# Patient Record
Sex: MALE | Race: WHITE | NOT HISPANIC OR LATINO | Employment: OTHER | URBAN - METROPOLITAN AREA
[De-identification: names, ages, dates, MRNs, and addresses within clinical notes are randomized per-mention and may not be internally consistent; named-entity substitution may affect disease eponyms.]

---

## 2017-01-13 DIAGNOSIS — Z09 FRACTURE FOLLOW-UP: Primary | ICD-10-CM

## 2017-01-16 ENCOUNTER — OFFICE VISIT (OUTPATIENT)
Dept: ORTHOPEDICS | Facility: CLINIC | Age: 72
End: 2017-01-16
Payer: MEDICARE

## 2017-01-16 ENCOUNTER — HOSPITAL ENCOUNTER (OUTPATIENT)
Dept: RADIOLOGY | Facility: HOSPITAL | Age: 72
Discharge: HOME | End: 2017-01-16
Attending: ORTHOPAEDIC SURGERY
Payer: MEDICARE

## 2017-01-16 VITALS — HEIGHT: 69 IN | WEIGHT: 167 LBS | BODY MASS INDEX: 24.73 KG/M2

## 2017-01-16 DIAGNOSIS — Z09 FRACTURE FOLLOW-UP: ICD-10-CM

## 2017-01-16 DIAGNOSIS — S52.502D CLOSED FRACTURE OF DISTAL END OF LEFT RADIUS WITH ROUTINE HEALING, UNSPECIFIED FRACTURE MORPHOLOGY, SUBSEQUENT ENCOUNTER: Primary | ICD-10-CM

## 2017-01-16 PROCEDURE — 99212 OFFICE O/P EST SF 10 MIN: CPT | Mod: PBBFAC,PO | Performed by: ORTHOPAEDIC SURGERY

## 2017-01-16 PROCEDURE — 99024 POSTOP FOLLOW-UP VISIT: CPT | Mod: ,,, | Performed by: ORTHOPAEDIC SURGERY

## 2017-01-16 PROCEDURE — 73110 X-RAY EXAM OF WRIST: CPT | Mod: 26,LT,, | Performed by: RADIOLOGY

## 2017-01-16 PROCEDURE — 99999 PR PBB SHADOW E&M-EST. PATIENT-LVL II: CPT | Mod: PBBFAC,,, | Performed by: ORTHOPAEDIC SURGERY

## 2017-01-16 NOTE — PROGRESS NOTES
Gracia is few months out from his distal radial fracture, doing well, nontender.    Good motion.  X-rays look good.  He is about six weeks out from cuff repair,   doing fairly well.  Still has some discomfort at this point, we are going to   advance his therapy.  We will check him back in six weeks' time.  No x-rays   needed.      JOÃO/VERONICA  dd: 01/16/2017 09:09:56 (CST)  td: 01/16/2017 22:02:28 (CST)  Doc ID   #[id number]  Job ID #[id number]    CC:

## 2017-01-23 ENCOUNTER — TELEPHONE (OUTPATIENT)
Dept: FAMILY MEDICINE | Facility: CLINIC | Age: 72
End: 2017-01-23

## 2017-01-24 RX ORDER — SIMVASTATIN 10 MG/1
10 TABLET, FILM COATED ORAL NIGHTLY
Qty: 90 TABLET | Refills: 0 | Status: SHIPPED | OUTPATIENT
Start: 2017-01-24 | End: 2017-02-20 | Stop reason: SDUPTHER

## 2017-01-25 ENCOUNTER — TELEPHONE (OUTPATIENT)
Dept: ORTHOPEDICS | Facility: CLINIC | Age: 72
End: 2017-01-25

## 2017-01-25 NOTE — TELEPHONE ENCOUNTER
----- Message from Charo Tse sent at 1/25/2017 10:21 AM CST -----  Contact: Nanci // [employer] // [phone]  Please call Nanci regarding paperwork she's been faxing to Dr. Real's office for signatures.    Fax # [phone]

## 2017-02-20 ENCOUNTER — OFFICE VISIT (OUTPATIENT)
Dept: FAMILY MEDICINE | Facility: CLINIC | Age: 72
End: 2017-02-20
Payer: MEDICARE

## 2017-02-20 VITALS
HEIGHT: 69 IN | BODY MASS INDEX: 25.34 KG/M2 | DIASTOLIC BLOOD PRESSURE: 70 MMHG | WEIGHT: 171.06 LBS | SYSTOLIC BLOOD PRESSURE: 111 MMHG | HEART RATE: 56 BPM

## 2017-02-20 DIAGNOSIS — I25.10 CORONARY ARTERY DISEASE, ANGINA PRESENCE UNSPECIFIED, UNSPECIFIED VESSEL OR LESION TYPE, UNSPECIFIED WHETHER NATIVE OR TRANSPLANTED HEART: ICD-10-CM

## 2017-02-20 DIAGNOSIS — I10 ESSENTIAL HYPERTENSION: Primary | ICD-10-CM

## 2017-02-20 DIAGNOSIS — H92.02 OTALGIA, LEFT: ICD-10-CM

## 2017-02-20 DIAGNOSIS — G47.00 INSOMNIA, UNSPECIFIED TYPE: ICD-10-CM

## 2017-02-20 PROCEDURE — 99214 OFFICE O/P EST MOD 30 MIN: CPT | Mod: S$PBB,,, | Performed by: FAMILY MEDICINE

## 2017-02-20 PROCEDURE — 99213 OFFICE O/P EST LOW 20 MIN: CPT | Mod: PBBFAC,PO | Performed by: FAMILY MEDICINE

## 2017-02-20 PROCEDURE — 99999 PR PBB SHADOW E&M-EST. PATIENT-LVL III: CPT | Mod: PBBFAC,,, | Performed by: FAMILY MEDICINE

## 2017-02-20 RX ORDER — TRAZODONE HYDROCHLORIDE 50 MG/1
50 TABLET ORAL NIGHTLY PRN
Qty: 90 TABLET | Refills: 0 | Status: SHIPPED | OUTPATIENT
Start: 2017-02-20 | End: 2017-04-18 | Stop reason: SDUPTHER

## 2017-02-20 RX ORDER — ATENOLOL 25 MG/1
TABLET ORAL
Qty: 30 TABLET | Refills: 3 | Status: SHIPPED | OUTPATIENT
Start: 2017-02-20 | End: 2017-12-05 | Stop reason: SDUPTHER

## 2017-02-20 RX ORDER — AMOXICILLIN AND CLAVULANATE POTASSIUM 875; 125 MG/1; MG/1
TABLET, FILM COATED ORAL
Refills: 0 | COMMUNITY
Start: 2017-01-16 | End: 2017-02-20

## 2017-02-20 RX ORDER — SIMVASTATIN 10 MG/1
10 TABLET, FILM COATED ORAL NIGHTLY
Qty: 90 TABLET | Refills: 0 | Status: SHIPPED | OUTPATIENT
Start: 2017-02-20 | End: 2017-09-11 | Stop reason: SDUPTHER

## 2017-02-20 NOTE — MR AVS SNAPSHOT
[facility]  [address] [facility]  [place] [address]  Phone: [phone]  Fax: [phone]                  Sergio Fagan III   2017 9:00 AM   Office Visit    Description:  Male : 1945   Provider:  Salvatore Mao MD   Department:  [facility]           Reason for Visit     Medication Refill     left ear pain           Diagnoses this Visit        Comments    Insomnia, unspecified type                To Do List           Future Appointments        Provider Department Dept Phone    3/6/2017 8:30 AM Sharif Real MD [facility] [phone]      Goals (5 Years of Data)     None       These Medications        Disp Refills Start End    trazodone (DESYREL) 50 MG tablet 90 tablet 0 2017     Take 1 tablet (50 mg total) by mouth nightly as needed. - Oral    Pharmacy: [facility] [place] [facility] - [address] Ph #: [phone]       simvastatin (ZOCOR) 10 MG tablet 90 tablet 0 2017    Take 1 tablet (10 mg total) by mouth every evening. - Oral    Pharmacy: [facility] [place] [facility] - [address] Ph #: [phone]       Notes to Pharmacy: Needs appt.    atenolol (TENORMIN) 25 MG tablet 30 tablet 3 2017     TAKE 1/2 TABLET BY MOUTH IN THE MORNING    Pharmacy: [facility] [place] [facility] - [address] Ph #: [phone]         [facility] On Call     [facility] Care Line -  Assistance  Registered nurses in the [facility] Center provide clinical advisement, health education, appointment booking, and other advisory services.  Call for this free service at [phone].             Medications           Message regarding Medications     Verify the changes and/or additions to your medication regime listed below are the same as discussed with your clinician today.  If any of these changes or additions are incorrect, please notify your healthcare provider.        STOP  "taking these medications     amoxicillin-clavulanate 875-125mg (AUGMENTIN) 875-125 mg per tablet TAKE 1 TABLET BY MOUTH TWICE DAILY UNTIL GONE    hydrocodone-acetaminophen 5-325mg (NORCO) 5-325 mg per tablet Take 1 tablet by mouth every 4 (four) hours as needed for Pain.    lidocaine-methyl sal-menthol (LIDOPRO PATCH) 4-4-5 % PtMd Apply 1 patch topically every 12 (twelve) hours as needed.    oxycodone-acetaminophen (PERCOCET) 5-325 mg per tablet Take 1 tablet by mouth every 4 to 6 hours as needed for Pain.    ZOSTAVAX, PF, 19,400 unit/0.65 mL injection            Verify that the below list of medications is an accurate representation of the medications you are currently taking.  If none reported, the list may be blank. If incorrect, please contact your healthcare provider. Carry this list with you in case of emergency.           Current Medications     ascorbic acid (VITAMIN C) 500 MG tablet Every morning    aspirin 81 MG chewable tablet     atenolol (TENORMIN) 25 MG tablet TAKE 1/2 TABLET BY MOUTH IN THE MORNING    multivitamin (ONE DAILY MULTIVITAMIN) per tablet Take 1 tablet by mouth once daily.    simvastatin (ZOCOR) 10 MG tablet Take 1 tablet (10 mg total) by mouth every evening.    trazodone (DESYREL) 50 MG tablet Take 1 tablet (50 mg total) by mouth nightly as needed.           Clinical Reference Information           Your Vitals Were     BP Pulse Height Weight BMI    111/70 56 5' 9" (1.753 m) 77.6 kg (171 lb 1.2 oz) 25.26 kg/m2      Blood Pressure          Most Recent Value    BP  111/70      Allergies as of 2/20/2017     No Known Allergies      Immunizations Administered on Date of Encounter - 2/20/2017     None      Orders Placed During Today's Visit      Normal Orders This Visit    Ambulatory referral to ENT       [facility] Sign-Up     Activating your [facility] account is as easy as 1-2-3!     1) Visit [email or web address], select Sign Up Now, enter this activation code and your date of birth, then select " Next.  [email or web address]  Expires: 4/6/2017  9:46 AM      2) Create a username and password to use when you visit [facility] in the future and select a security question in case you lose your password and select Next.    3) Enter your e-mail address and click Sign Up!    Additional Information  If you have questions, please e-mail [email or web address] or call [phone] to talk to our [email or web address] staff. Remember, [facility] is NOT to be used for urgent needs. For medical emergencies, dial 911.         Smoking Cessation     If you would like to quit smoking:   You may be eligible for free services if you are a [place] resident and started smoking cigarettes before September 1, 1988.  Call the Smoking Cessation Trust (SCT) toll free at (804) 573-6234 or (626) 601-9046.   Call 6-385-QUIT-NOW if you do not meet the above criteria.            Language Assistance Services     ATTENTION: Language assistance services are available, free of charge. Please call [phone].      ATENCIÓN: Si habla español, tiene a arvizu disposición servicios gratuitos de asistencia lingüística. Llame al [phone].     CHÚ Ý: N?u b?n nói Ti?ng Vi?t, có các d?ch v? h? tr? ngôn ng? mi?n phí dành cho b?n. G?i s? [phone].         [facility] complies with applicable Federal civil rights laws and does not discriminate on the basis of race, color, national origin, age, disability, or sex.

## 2017-02-26 NOTE — PROGRESS NOTES
HISTORY OF PRESENT ILLNESS:  A pleasant male here today, 71 years of age.  He   has chronic insomnia, hypertension, coronary artery disease; reviewed and   addressed each of those issues.  He is asymptomatic from that standpoint.  He is   a nonsmoker now, spends a lot of times in the outdoors.    REVIEW OF SYSTEMS:  Includes some pain several days or longer behind his left   ear.    PHYSICAL EXAMINATION:  HEENT:  The ears appear normal bilaterally.  No tenderness or acute skin rashes.    No otitis media or otitis externa.  No scalp abnormalities.  VITAL SIGNS:  Normal.  CHEST:  Clear.    ASSESSMENT:  Hypertension, left otalgia, coronary artery disease and chronic   insomnia.    PLAN:  We discussed the possibilities for the ear, observation for that;   caffeine and exercise for the insomnia was addressed, blood pressure monitoring,   put a consult in Ear, Nose and Throat if the ear pain persists.      KAY  dd: 02/26/2017 10:18:32 (CST)  td: 02/26/2017 14:07:59 (CST)  Doc ID   #[id number]  Job ID #[id number]    CC:

## 2017-03-31 ENCOUNTER — CLINICAL SUPPORT (OUTPATIENT)
Dept: SMOKING CESSATION | Facility: CLINIC | Age: 72
End: 2017-03-31
Payer: COMMERCIAL

## 2017-03-31 DIAGNOSIS — F17.200 NICOTINE DEPENDENCE: Primary | ICD-10-CM

## 2017-03-31 PROCEDURE — 99407 BEHAV CHNG SMOKING > 10 MIN: CPT | Mod: S$GLB,,,

## 2017-04-18 DIAGNOSIS — G47.00 INSOMNIA, UNSPECIFIED TYPE: ICD-10-CM

## 2017-04-19 RX ORDER — TRAZODONE HYDROCHLORIDE 50 MG/1
TABLET ORAL
Qty: 90 TABLET | Refills: 2 | Status: SHIPPED | OUTPATIENT
Start: 2017-04-19 | End: 2018-02-12 | Stop reason: SDUPTHER

## 2017-08-07 ENCOUNTER — TELEPHONE (OUTPATIENT)
Dept: FAMILY MEDICINE | Facility: CLINIC | Age: 72
End: 2017-08-07

## 2017-08-07 DIAGNOSIS — E78.5 HYPERLIPIDEMIA, UNSPECIFIED HYPERLIPIDEMIA TYPE: Primary | ICD-10-CM

## 2017-08-07 RX ORDER — SIMVASTATIN 10 MG/1
TABLET, FILM COATED ORAL
Qty: 90 TABLET | OUTPATIENT
Start: 2017-08-07

## 2017-08-09 NOTE — TELEPHONE ENCOUNTER
----- Message from Radha Bautista sent at 8/9/2017  7:33 AM CDT -----  Contact:  call //[phone]    Calling   For blood  Work  Orders    Put  In  The computer ,   Pt  Is  Going   To [facility] [place]  In  [place] // please call

## 2017-08-17 ENCOUNTER — TELEPHONE (OUTPATIENT)
Dept: FAMILY MEDICINE | Facility: CLINIC | Age: 72
End: 2017-08-17

## 2017-08-17 NOTE — TELEPHONE ENCOUNTER
----- Message from Casandra Gomez sent at 8/17/2017 10:18 AM CDT -----  Contact: pt  Pt returning nurse phone call regarding lab results..[phone] (home)

## 2017-09-11 RX ORDER — SIMVASTATIN 10 MG/1
10 TABLET, FILM COATED ORAL NIGHTLY
Qty: 90 TABLET | Refills: 0 | Status: SHIPPED | OUTPATIENT
Start: 2017-09-11 | End: 2017-12-05 | Stop reason: SDUPTHER

## 2017-09-11 NOTE — TELEPHONE ENCOUNTER
----- Message from Jenn Ram sent at 9/11/2017 10:29 AM CDT -----  Contact: Patient  Patient called advising he needs a refill of his simvastatin (ZOCOR) 10 MG tablet.  He advised he has been out of the medication for 3 days.  Medication Name:simvastatin (ZOCOR)  Medication Strength:  10 MG  Preferred pharmacy: [facility] [place] [facility]  [address]  Call Back Number: [phone]. !

## 2017-10-10 ENCOUNTER — CLINICAL SUPPORT (OUTPATIENT)
Dept: SMOKING CESSATION | Facility: CLINIC | Age: 72
End: 2017-10-10
Payer: COMMERCIAL

## 2017-10-10 DIAGNOSIS — F17.200 NICOTINE DEPENDENCE: Primary | ICD-10-CM

## 2017-10-10 PROCEDURE — 99407 BEHAV CHNG SMOKING > 10 MIN: CPT | Mod: S$GLB,,, | Performed by: INTERNAL MEDICINE

## 2017-12-05 RX ORDER — SIMVASTATIN 10 MG/1
10 TABLET, FILM COATED ORAL NIGHTLY
Qty: 30 TABLET | Refills: 0 | Status: SHIPPED | OUTPATIENT
Start: 2017-12-05 | End: 2018-01-15 | Stop reason: SDUPTHER

## 2017-12-05 RX ORDER — ATENOLOL 25 MG/1
TABLET ORAL
Qty: 15 TABLET | Refills: 0 | Status: SHIPPED | OUTPATIENT
Start: 2017-12-05 | End: 2018-01-15 | Stop reason: SDUPTHER

## 2018-01-15 ENCOUNTER — LAB VISIT (OUTPATIENT)
Dept: LAB | Facility: HOSPITAL | Age: 73
End: 2018-01-15
Attending: NURSE PRACTITIONER
Payer: MEDICARE

## 2018-01-15 ENCOUNTER — OFFICE VISIT (OUTPATIENT)
Dept: FAMILY MEDICINE | Facility: CLINIC | Age: 73
End: 2018-01-15
Payer: MEDICARE

## 2018-01-15 ENCOUNTER — OFFICE VISIT (OUTPATIENT)
Dept: CARDIOLOGY | Facility: CLINIC | Age: 73
End: 2018-01-15
Payer: MEDICARE

## 2018-01-15 VITALS
DIASTOLIC BLOOD PRESSURE: 60 MMHG | HEIGHT: 69 IN | SYSTOLIC BLOOD PRESSURE: 118 MMHG | OXYGEN SATURATION: 95 % | BODY MASS INDEX: 25.86 KG/M2 | HEART RATE: 60 BPM | WEIGHT: 174.63 LBS | TEMPERATURE: 98 F

## 2018-01-15 VITALS
WEIGHT: 174.38 LBS | BODY MASS INDEX: 25.83 KG/M2 | DIASTOLIC BLOOD PRESSURE: 65 MMHG | HEIGHT: 69 IN | HEART RATE: 58 BPM | SYSTOLIC BLOOD PRESSURE: 117 MMHG

## 2018-01-15 DIAGNOSIS — I25.10 CORONARY ARTERY DISEASE INVOLVING NATIVE CORONARY ARTERY OF NATIVE HEART WITHOUT ANGINA PECTORIS: ICD-10-CM

## 2018-01-15 DIAGNOSIS — E78.5 HYPERLIPIDEMIA, UNSPECIFIED HYPERLIPIDEMIA TYPE: Chronic | ICD-10-CM

## 2018-01-15 DIAGNOSIS — I10 ESSENTIAL HYPERTENSION: Primary | Chronic | ICD-10-CM

## 2018-01-15 DIAGNOSIS — R79.89 ABNORMAL CBC: ICD-10-CM

## 2018-01-15 DIAGNOSIS — Z95.1 S/P CABG (CORONARY ARTERY BYPASS GRAFT): ICD-10-CM

## 2018-01-15 DIAGNOSIS — I35.0 AORTIC VALVE STENOSIS, ETIOLOGY OF CARDIAC VALVE DISEASE UNSPECIFIED: ICD-10-CM

## 2018-01-15 DIAGNOSIS — I35.0 NONRHEUMATIC AORTIC VALVE STENOSIS: Primary | ICD-10-CM

## 2018-01-15 DIAGNOSIS — E78.5 HYPERLIPIDEMIA WITH TARGET LOW DENSITY LIPOPROTEIN (LDL) CHOLESTEROL LESS THAN 70 MG/DL: ICD-10-CM

## 2018-01-15 LAB
BASOPHILS # BLD AUTO: 0.04 K/UL
BASOPHILS NFR BLD: 0.6 %
DIFFERENTIAL METHOD: ABNORMAL
EOSINOPHIL # BLD AUTO: 0.1 K/UL
EOSINOPHIL NFR BLD: 1.1 %
ERYTHROCYTE [DISTWIDTH] IN BLOOD BY AUTOMATED COUNT: 15 %
HCT VFR BLD AUTO: 45.9 %
HGB BLD-MCNC: 15.2 G/DL
IMM GRANULOCYTES # BLD AUTO: 0.03 K/UL
IMM GRANULOCYTES NFR BLD AUTO: 0.5 %
LYMPHOCYTES # BLD AUTO: 2.3 K/UL
LYMPHOCYTES NFR BLD: 35.9 %
MCH RBC QN AUTO: 29 PG
MCHC RBC AUTO-ENTMCNC: 33.1 G/DL
MCV RBC AUTO: 88 FL
MONOCYTES # BLD AUTO: 0.6 K/UL
MONOCYTES NFR BLD: 9.5 %
NEUTROPHILS # BLD AUTO: 3.4 K/UL
NEUTROPHILS NFR BLD: 52.4 %
NRBC BLD-RTO: 0 /100 WBC
PLATELET # BLD AUTO: 252 K/UL
PMV BLD AUTO: 11.6 FL
RBC # BLD AUTO: 5.24 M/UL
WBC # BLD AUTO: 6.43 K/UL

## 2018-01-15 PROCEDURE — 99999 PR PBB SHADOW E&M-EST. PATIENT-LVL IV: CPT | Mod: PBBFAC,,, | Performed by: NURSE PRACTITIONER

## 2018-01-15 PROCEDURE — 99213 OFFICE O/P EST LOW 20 MIN: CPT | Mod: S$PBB,25,, | Performed by: NURSE PRACTITIONER

## 2018-01-15 PROCEDURE — 99214 OFFICE O/P EST MOD 30 MIN: CPT | Mod: PBBFAC,PO | Performed by: NURSE PRACTITIONER

## 2018-01-15 PROCEDURE — 85025 COMPLETE CBC W/AUTO DIFF WBC: CPT

## 2018-01-15 PROCEDURE — 99999 PR PBB SHADOW E&M-EST. PATIENT-LVL III: CPT | Mod: PBBFAC,,, | Performed by: INTERNAL MEDICINE

## 2018-01-15 PROCEDURE — 99213 OFFICE O/P EST LOW 20 MIN: CPT | Mod: PBBFAC,27,PO,25 | Performed by: INTERNAL MEDICINE

## 2018-01-15 PROCEDURE — 99214 OFFICE O/P EST MOD 30 MIN: CPT | Mod: S$PBB,,, | Performed by: INTERNAL MEDICINE

## 2018-01-15 PROCEDURE — 36415 COLL VENOUS BLD VENIPUNCTURE: CPT | Mod: PO

## 2018-01-15 PROCEDURE — 90662 IIV NO PRSV INCREASED AG IM: CPT | Mod: PBBFAC,PO

## 2018-01-15 RX ORDER — ATENOLOL 25 MG/1
TABLET ORAL
Qty: 15 TABLET | Refills: 2 | Status: SHIPPED | OUTPATIENT
Start: 2018-01-15 | End: 2018-03-16 | Stop reason: SDUPTHER

## 2018-01-15 RX ORDER — SIMVASTATIN 10 MG/1
10 TABLET, FILM COATED ORAL NIGHTLY
Qty: 30 TABLET | Refills: 2 | Status: SHIPPED | OUTPATIENT
Start: 2018-01-15 | End: 2018-02-20 | Stop reason: SDUPTHER

## 2018-01-15 RX ORDER — MUPIROCIN 20 MG/G
OINTMENT TOPICAL 3 TIMES DAILY
Qty: 30 G | Refills: 0 | Status: SHIPPED | OUTPATIENT
Start: 2018-01-15 | End: 2019-01-23

## 2018-01-15 NOTE — PROGRESS NOTES
Subjective:       Patient ID: Sergio Fagan III is a 72 y.o. male.    Chief Complaint: Medication Refill    Stopped smoking 6 months ago.       Hypertension   This is a chronic problem. The current episode started more than 1 year ago. The problem is controlled. Pertinent negatives include no blurred vision, chest pain or shortness of breath. Risk factors for coronary artery disease include dyslipidemia and male gender (recently quit smoking. ). Past treatments include beta blockers. hx of aortic stenosis and s/p CABG.   Hyperlipidemia   This is a chronic problem. The current episode started more than 1 year ago. Pertinent negatives include no chest pain or shortness of breath. Current antihyperlipidemic treatment includes statins. Risk factors for coronary artery disease include dyslipidemia, hypertension and male sex.     Vitals:    01/15/18 0927   BP: 118/60   Pulse: 60   Temp: 98.2 °F (36.8 °C)     Review of Systems   Constitutional: Negative for chills, diaphoresis and fever.   HENT: Negative for ear discharge, ear pain, postnasal drip, rhinorrhea, sinus pain, sinus pressure, sneezing and voice change.    Eyes: Negative for blurred vision and visual disturbance.   Respiratory: Negative for cough, chest tightness and shortness of breath.    Cardiovascular: Negative for chest pain.        Hx of Hypertension and hyperlipidemia.  S/p CABG. Hx of aortic stenosis.       BP Readings from Last 3 Encounters:   01/15/18 117/65   01/15/18 118/60   02/20/17 111/70     CMP  Sodium   Date Value Ref Range Status   08/17/2017 141 136 - 145 mmol/L Final     Potassium   Date Value Ref Range Status   08/17/2017 4.5 3.5 - 5.1 mmol/L Final     Chloride   Date Value Ref Range Status   08/17/2017 106 95 - 110 mmol/L Final     CO2   Date Value Ref Range Status   08/17/2017 29 23 - 29 mmol/L Final     Glucose   Date Value Ref Range Status   08/17/2017 102 70 - 110 mg/dL Final     BUN, Bld   Date Value Ref Range Status   08/17/2017 18  8 - 23 mg/dL Final     Creatinine   Date Value Ref Range Status   08/17/2017 1.1 0.5 - 1.4 mg/dL Final     Calcium   Date Value Ref Range Status   08/17/2017 9.3 8.7 - 10.5 mg/dL Final     Total Protein   Date Value Ref Range Status   08/17/2017 7.1 6.0 - 8.4 g/dL Final     Albumin   Date Value Ref Range Status   08/17/2017 3.9 3.5 - 5.2 g/dL Final     Total Bilirubin   Date Value Ref Range Status   08/17/2017 0.7 0.1 - 1.0 mg/dL Final     Comment:     For infants and newborns, interpretation of results should be based  on gestational age, weight and in agreement with clinical  observations.  Premature Infant recommended reference ranges:  Up to 24 hours.............<8.0 mg/dL  Up to 48 hours............<12.0 mg/dL  3-5 days..................<15.0 mg/dL  6-29 days.................<15.0 mg/dL       Alkaline Phosphatase   Date Value Ref Range Status   08/17/2017 48 (L) 55 - 135 U/L Final     AST   Date Value Ref Range Status   08/17/2017 21 10 - 40 U/L Final     ALT   Date Value Ref Range Status   08/17/2017 16 10 - 44 U/L Final     Anion Gap   Date Value Ref Range Status   08/17/2017 6 (L) 8 - 16 mmol/L Final     eGFR if    Date Value Ref Range Status   08/17/2017 >60.0 >60 mL/min/1.73 m^2 Final     eGFR if non    Date Value Ref Range Status   08/17/2017 >60.0 >60 mL/min/1.73 m^2 Final     Comment:     Calculation used to obtain the estimated glomerular filtration  rate (eGFR) is the CKD-EPI equation. Since race is unknown   in our information system, the eGFR values for   -American and Non--American patients are given   for each creatinine result.       Lab Results   Component Value Date    CHOL 214 (H) 08/17/2017    CHOL 188 02/08/2016    CHOL 212 (H) 03/18/2015     Lab Results   Component Value Date    HDL 47 08/17/2017    HDL 54 02/08/2016    HDL 54 03/18/2015     Lab Results   Component Value Date    LDLCALC 138.0 08/17/2017    LDLCALC 121.0 02/08/2016    LDLCALC  142.0 03/18/2015     Lab Results   Component Value Date    TRIG 145 08/17/2017    TRIG 65 02/08/2016    TRIG 80 03/18/2015     Lab Results   Component Value Date    CHOLHDL 22.0 08/17/2017    CHOLHDL 28.7 02/08/2016    CHOLHDL 25.5 03/18/2015     Objective:      Physical Exam   Constitutional: He is oriented to person, place, and time. Vital signs are normal. He appears well-developed and well-nourished. He is cooperative.   HENT:   Head: Normocephalic and atraumatic.   Right Ear: Hearing, external ear and ear canal normal.   Left Ear: Hearing, external ear and ear canal normal.   Nose: Nose normal.   Mouth/Throat: Oropharynx is clear and moist and mucous membranes are normal.   Eyes: Conjunctivae and EOM are normal.   Neck: Normal range of motion. Neck supple.   Cardiovascular: Normal rate and regular rhythm.    Murmur heard.  Pulmonary/Chest: Effort normal and breath sounds normal. No respiratory distress.   Musculoskeletal: Normal range of motion.   Lymphadenopathy:        Head (right side): No submental, no submandibular, no tonsillar, no preauricular and no posterior auricular adenopathy present.        Head (left side): No submental, no submandibular, no tonsillar, no preauricular and no posterior auricular adenopathy present.   Neurological: He is alert and oriented to person, place, and time.   Skin: Skin is warm and dry. Capillary refill takes less than 2 seconds.   Psychiatric: He has a normal mood and affect. His speech is normal and behavior is normal. Judgment and thought content normal.   Nursing note and vitals reviewed.      Assessment & Plan:       Essential hypertension  -     atenolol (TENORMIN) 25 MG tablet; TAKE 1/2 TABLET BY MOUTH IN THE MORNING  Dispense: 15 tablet; Refill: 2  -     Ambulatory referral to Cardiology    Hyperlipidemia, unspecified hyperlipidemia type  -     simvastatin (ZOCOR) 10 MG tablet; Take 1 tablet (10 mg total) by mouth every evening.  Dispense: 30 tablet; Refill: 2  -      Ambulatory referral to Cardiology    Aortic valve stenosis, etiology of cardiac valve disease unspecified  -     atenolol (TENORMIN) 25 MG tablet; TAKE 1/2 TABLET BY MOUTH IN THE MORNING  Dispense: 15 tablet; Refill: 2  -     Ambulatory referral to Cardiology    Coronary artery disease involving native coronary artery of native heart without angina pectoris  -     Ambulatory referral to Cardiology    S/P CABG (coronary artery bypass graft) - x3 06/2008; LIMA to LAD; SVG to OM1; SVG to PDA  -     Ambulatory referral to Cardiology    Abnormal CBC  -     CBC auto differential; Future; Expected date: 01/15/2018    Other orders  -     mupirocin (BACTROBAN) 2 % ointment; Apply topically 3 (three) times daily.  Dispense: 30 g; Refill: 0    Got flu shot today       Follow-up if symptoms worsen or fail to improve.   Make appointment with Cardiology Md for follow up  Make appointment with Dr. Garcia, PCP

## 2018-01-16 ENCOUNTER — TELEPHONE (OUTPATIENT)
Dept: FAMILY MEDICINE | Facility: CLINIC | Age: 73
End: 2018-01-16

## 2018-01-16 NOTE — TELEPHONE ENCOUNTER
Please notify pt of CBC results: within acceptable range, improved since last screening.   Keep appointment with Dr Garcia for annual exam 2/20/2018.

## 2018-01-22 ENCOUNTER — TELEPHONE (OUTPATIENT)
Dept: FAMILY MEDICINE | Facility: CLINIC | Age: 73
End: 2018-01-22

## 2018-01-22 ENCOUNTER — TELEPHONE (OUTPATIENT)
Dept: INTERNAL MEDICINE | Facility: CLINIC | Age: 73
End: 2018-01-22

## 2018-01-22 NOTE — TELEPHONE ENCOUNTER
----- Message from Johnievlad Sarmad sent at 1/22/2018 10:50 AM CST -----  Contact: Patient  Patient states that he is returning the call and to please call him back about his test results.  Can you please call him back at [phone].  Thank you

## 2018-01-22 NOTE — TELEPHONE ENCOUNTER
Attempted to contact pt. No answer. LVM to contact office for lab results.     Please disregard previous message sent. Labs have already been addressed on pt.

## 2018-01-22 NOTE — TELEPHONE ENCOUNTER
----- Message from Iqra Mauricio sent at 1/20/2018 10:44 AM CST -----  Patient is calling for lab test results.Please call patient back at [phone] to advise.  Thanks!

## 2018-01-22 NOTE — TELEPHONE ENCOUNTER
----- Message from Chani Lewis sent at 1/22/2018  3:06 PM CST -----  Contact: Patient  Sergio, patient [phone] returning phone call to office. Please advise. Thanks.

## 2018-02-12 DIAGNOSIS — G47.00 INSOMNIA, UNSPECIFIED TYPE: ICD-10-CM

## 2018-02-14 RX ORDER — TRAZODONE HYDROCHLORIDE 50 MG/1
TABLET ORAL
Qty: 30 TABLET | Refills: 0 | Status: SHIPPED | OUTPATIENT
Start: 2018-02-14 | End: 2018-03-22 | Stop reason: SDUPTHER

## 2018-02-19 NOTE — PROGRESS NOTES
HISTORY OF PRESENT ILLNESS:  Pt. is a 72 y.o. male presents  for monitoring of his chronic medical conditions, CAD and moderate AS; last echo 03/2016, hyperlipidemia, HTN,  I have not seen him since 2016.  Did see a NP a month ago for med refills.  Cardiology had ordered 2 D echo, done this morning, results not yet back.  HTN is in good control.    Health Maintenance Topics with due status: Not Due       Topic Last Completion Date    Colonoscopy 04/18/2012    Lipid Panel 08/17/2017    TETANUS VACCINE 02/21/2018    High Dose Statin 02/21/2018     There are no preventive care reminders to display for this patient.    Lab Results   Component Value Date    WBC 6.43 01/15/2018    HGB 15.2 01/15/2018    HCT 45.9 01/15/2018     01/15/2018    CHOL 214 (H) 08/17/2017    TRIG 145 08/17/2017    HDL 47 08/17/2017    LDLCALC 138.0 08/17/2017    ALT 16 08/17/2017    AST 21 08/17/2017     08/17/2017    K 4.5 08/17/2017     08/17/2017    CREATININE 1.1 08/17/2017    BUN 18 08/17/2017    CO2 29 08/17/2017    ALBUMIN 3.9 08/17/2017    TSH 1.094 02/08/2016    PSA 0.26 04/09/2012    INR 1.0 06/02/2011    HGBA1C 6.1 06/02/2011       Past Medical History:   Diagnosis Date    CAD (coronary artery disease) CABG    Cancer     hard palate    Colon polyps     2007, hyperplastic    DJD (degenerative joint disease) of cervical spine     has seen Dr. Keyonna Light    Fractures     multiple    Hand arthritis     Hand pain, right     Hyperlipidemia     Nephrolithiasis        Past Surgical History:   Procedure Laterality Date    BACK SURGERY      CARDIAC SURGERY  2004    cervical disckectomy      CORONARY ARTERY BYPASS GRAFT      x3    occipital lipoma      oral cancer      hard palate    PPH      removal renal stone      1/16    SHOULDER ARTHROSCOPY      SHOULDER SURGERY         Social History     Social History    Marital status: Single     Spouse name: N/A    Number of children: 2    Years of education:  N/A     Social History Main Topics    Smoking status: Current Every Day Smoker     Types: Cigars    Smokeless tobacco: Former User     Quit date: 4/9/2000      Comment: quit 2002, but restarted    Alcohol use No    Drug use: No    Sexual activity: Not Currently     Other Topics Concern    None     Social History Narrative    None       ROS:  GENERAL: No fever, chills, fatigability or weight loss.  SKIN: No rashes, itching or changes in color or texture of skin.  HEAD: No headaches or recent head trauma.  EARS: Denies ear pain, discharge or vertigo.  NOSE: No loss of smell, no epistaxis or postnasal drip.  MOUTH & THROAT: No hoarseness or change in voice. No excessive gum bleeding.  NODES: Denies swollen glands.  CHEST: Denies ONEILL, cyanosis, wheezing, cough and sputum production.  CARDIOVASCULAR: Denies chest pain, PND, orthopnea or reduced exercise tolerance.  ABDOMEN: Appetite fine. No weight loss. Denies constipation, diarrhea, abdominal pain, hematemesis or blood in stool.  URINARY: No flank pain, dysuria or hematuria.  PERIPHERAL VASCULAR: No claudication or cyanosis. No edema.  MUSCULOSKELETAL: No joint stiffness or swelling. Denies back pain.  NEUROLOGIC: Denies numbness    PE:   Vitals:   Vitals:    02/20/18 0809   BP: 110/70   Pulse: (!) 57   Resp: 20     GENERAL: no acute distress, A&Ox3, comfortable.  MAle with BMI of 24   HEENT: tympanic membranes clear, nasal mucosa pink, no pharyngeal erythema or exudate  NECK: supple, no cervical lymphadenopathy, no thyromegaly; no supraclavicular nodes;   CHEST:  clear to auscultation bilaterally, no crackles or wheeze; no increased work of breathing;  CARDIOVASCULAR: regular rate and rhythm, no rubs, murmurs or gallops.  ABDOMEN: normal bowel sounds, soft non-tender, non-distended; no palpable organomegaly;   EXT: no clubbing, cyanosis or edema.     ASSESSMENT/PLAN:     Hyperlipidemia, unspecified hyperlipidemia type: wishes to change to MD in [place] since I am  leaving;  -     simvastatin (ZOCOR) 10 MG tablet; Take 1 tablet (10 mg total) by mouth every evening.  Dispense: 90 tablet; Refill: 1  -     Ambulatory Referral to Internal Medicine    CAD/aortic stenosis: awaiting results of 2 D echo;    HTN: in control on current med; will continue    Medication List with Changes/Refills   New Medications    TRAMADOL (ULTRAM) 50 MG TABLET    Take 1 tablet (50 mg total) by mouth every 6 (six) hours as needed (breakthrough pain).   Current Medications    ASCORBIC ACID (VITAMIN C) 500 MG TABLET    Every morning    ASPIRIN 81 MG CHEWABLE TABLET        ATENOLOL (TENORMIN) 25 MG TABLET    TAKE 1/2 TABLET BY MOUTH IN THE MORNING    MULTIVITAMIN (ONE DAILY MULTIVITAMIN) PER TABLET    Take 1 tablet by mouth once daily.    MUPIROCIN (BACTROBAN) 2 % OINTMENT    Apply topically 3 (three) times daily.    TRAZODONE (DESYREL) 50 MG TABLET    TAKE ONE TABLET BY MOUTH EVERY NIGHT AS NEEDED   Changed and/or Refilled Medications    Modified Medication Previous Medication    SIMVASTATIN (ZOCOR) 10 MG TABLET simvastatin (ZOCOR) 10 MG tablet       Take 1 tablet (10 mg total) by mouth every evening.    Take 1 tablet (10 mg total) by mouth every evening.     Call if condition changes or worsens.

## 2018-02-20 ENCOUNTER — OFFICE VISIT (OUTPATIENT)
Dept: INTERNAL MEDICINE | Facility: CLINIC | Age: 73
End: 2018-02-20
Payer: MEDICARE

## 2018-02-20 ENCOUNTER — CLINICAL SUPPORT (OUTPATIENT)
Dept: CARDIOLOGY | Facility: CLINIC | Age: 73
End: 2018-02-20
Attending: INTERNAL MEDICINE
Payer: MEDICARE

## 2018-02-20 VITALS
WEIGHT: 170.88 LBS | HEIGHT: 69 IN | HEART RATE: 57 BPM | OXYGEN SATURATION: 97 % | SYSTOLIC BLOOD PRESSURE: 110 MMHG | DIASTOLIC BLOOD PRESSURE: 70 MMHG | BODY MASS INDEX: 25.31 KG/M2 | RESPIRATION RATE: 20 BRPM

## 2018-02-20 DIAGNOSIS — I10 ESSENTIAL HYPERTENSION: Chronic | ICD-10-CM

## 2018-02-20 DIAGNOSIS — E78.5 HYPERLIPIDEMIA, UNSPECIFIED HYPERLIPIDEMIA TYPE: Chronic | ICD-10-CM

## 2018-02-20 DIAGNOSIS — Z95.1 S/P CABG (CORONARY ARTERY BYPASS GRAFT): ICD-10-CM

## 2018-02-20 DIAGNOSIS — I25.10 CORONARY ARTERY DISEASE INVOLVING NATIVE CORONARY ARTERY OF NATIVE HEART WITHOUT ANGINA PECTORIS: ICD-10-CM

## 2018-02-20 DIAGNOSIS — E78.5 HYPERLIPIDEMIA WITH TARGET LOW DENSITY LIPOPROTEIN (LDL) CHOLESTEROL LESS THAN 70 MG/DL: ICD-10-CM

## 2018-02-20 DIAGNOSIS — I35.0 NONRHEUMATIC AORTIC VALVE STENOSIS: ICD-10-CM

## 2018-02-20 PROCEDURE — 99214 OFFICE O/P EST MOD 30 MIN: CPT | Mod: S$PBB,,, | Performed by: INTERNAL MEDICINE

## 2018-02-20 PROCEDURE — 99213 OFFICE O/P EST LOW 20 MIN: CPT | Mod: PBBFAC,25,PO | Performed by: INTERNAL MEDICINE

## 2018-02-20 PROCEDURE — 1126F AMNT PAIN NOTED NONE PRSNT: CPT | Mod: ,,, | Performed by: INTERNAL MEDICINE

## 2018-02-20 PROCEDURE — 99999 PR PBB SHADOW E&M-EST. PATIENT-LVL III: CPT | Mod: PBBFAC,,, | Performed by: INTERNAL MEDICINE

## 2018-02-20 PROCEDURE — 93306 TTE W/DOPPLER COMPLETE: CPT | Mod: PBBFAC,PO | Performed by: INTERNAL MEDICINE

## 2018-02-20 PROCEDURE — 1159F MED LIST DOCD IN RCRD: CPT | Mod: ,,, | Performed by: INTERNAL MEDICINE

## 2018-02-20 RX ORDER — SIMVASTATIN 10 MG/1
10 TABLET, FILM COATED ORAL NIGHTLY
Qty: 90 TABLET | Refills: 1 | Status: SHIPPED | OUTPATIENT
Start: 2018-02-20 | End: 2018-05-29 | Stop reason: SDUPTHER

## 2018-02-21 LAB
AORTIC VALVE REGURGITATION: ABNORMAL
AORTIC VALVE STENOSIS: ABNORMAL
DIASTOLIC DYSFUNCTION: NO
ESTIMATED PA SYSTOLIC PRESSURE: 34.36
MITRAL VALVE REGURGITATION: ABNORMAL
RETIRED EF AND QEF - SEE NOTES: 65 (ref 55–65)
TRICUSPID VALVE REGURGITATION: ABNORMAL

## 2018-02-26 ENCOUNTER — TELEPHONE (OUTPATIENT)
Dept: CARDIOLOGY | Facility: CLINIC | Age: 73
End: 2018-02-26

## 2018-02-26 NOTE — TELEPHONE ENCOUNTER
Test(s) Reviewed  I have reviewed the following in detail:  [] Stress test   [] Angiography   [x] Echocardiogram   [] Labs   [] Other:       Call Pt.  Aortic valve has worsened some and recommend f/u Echo at next visit in 6 months or sooner if symptoms develop

## 2018-03-16 ENCOUNTER — TELEPHONE (OUTPATIENT)
Dept: FAMILY MEDICINE | Facility: CLINIC | Age: 73
End: 2018-03-16

## 2018-03-16 DIAGNOSIS — I10 ESSENTIAL HYPERTENSION: Chronic | ICD-10-CM

## 2018-03-16 DIAGNOSIS — I35.0 AORTIC VALVE STENOSIS, ETIOLOGY OF CARDIAC VALVE DISEASE UNSPECIFIED: ICD-10-CM

## 2018-03-18 RX ORDER — ATENOLOL 25 MG/1
TABLET ORAL
Qty: 15 TABLET | Refills: 1 | Status: SHIPPED | OUTPATIENT
Start: 2018-03-18 | End: 2018-05-29 | Stop reason: SDUPTHER

## 2018-03-18 NOTE — TELEPHONE ENCOUNTER
Pt. Is supposed to be getting new provider closer to his home in [facility].  Please let him I have refilled med for one month with one refill to let him get new MD.

## 2018-03-19 NOTE — TELEPHONE ENCOUNTER
Spoke with pt and informed pt that medication has been sent to pharmacy he verbalized that he understood

## 2018-03-22 DIAGNOSIS — G47.00 INSOMNIA, UNSPECIFIED TYPE: ICD-10-CM

## 2018-03-22 RX ORDER — TRAZODONE HYDROCHLORIDE 50 MG/1
TABLET ORAL
Qty: 30 TABLET | Refills: 0 | Status: SHIPPED | OUTPATIENT
Start: 2018-03-22 | End: 2018-04-04 | Stop reason: SDUPTHER

## 2018-04-04 ENCOUNTER — TELEPHONE (OUTPATIENT)
Dept: FAMILY MEDICINE | Facility: CLINIC | Age: 73
End: 2018-04-04

## 2018-04-04 DIAGNOSIS — G47.00 INSOMNIA, UNSPECIFIED TYPE: ICD-10-CM

## 2018-04-05 RX ORDER — TRAZODONE HYDROCHLORIDE 50 MG/1
TABLET ORAL
Qty: 30 TABLET | Refills: 0 | Status: SHIPPED | OUTPATIENT
Start: 2018-04-05 | End: 2018-05-29 | Stop reason: SDUPTHER

## 2018-04-05 NOTE — TELEPHONE ENCOUNTER
Spoke to patient and notified he will need to find a new PCP as Dr. Garcia will be leaving 1st week in May. Patient verbalized understanding.

## 2018-05-10 DIAGNOSIS — G47.00 INSOMNIA, UNSPECIFIED TYPE: ICD-10-CM

## 2018-05-10 RX ORDER — TRAZODONE HYDROCHLORIDE 50 MG/1
TABLET ORAL
Qty: 30 TABLET | Refills: 0 | OUTPATIENT
Start: 2018-05-10

## 2018-05-29 PROBLEM — I25.810 CORONARY ARTERY DISEASE INVOLVING CORONARY BYPASS GRAFT OF NATIVE HEART WITHOUT ANGINA PECTORIS: Status: ACTIVE | Noted: 2018-05-29

## 2019-01-23 PROBLEM — G47.00 INSOMNIA: Status: ACTIVE | Noted: 2019-01-23

## 2019-02-20 ENCOUNTER — INITIAL CONSULT (OUTPATIENT)
Dept: DERMATOLOGY | Facility: CLINIC | Age: 74
End: 2019-02-20
Payer: MEDICARE

## 2019-02-20 DIAGNOSIS — D18.00 ANGIOMA: ICD-10-CM

## 2019-02-20 DIAGNOSIS — Z85.828 HISTORY OF NONMELANOMA SKIN CANCER: ICD-10-CM

## 2019-02-20 DIAGNOSIS — L57.0 AK (ACTINIC KERATOSIS): ICD-10-CM

## 2019-02-20 DIAGNOSIS — D22.9 MULTIPLE BENIGN NEVI: ICD-10-CM

## 2019-02-20 DIAGNOSIS — D48.9 NEOPLASM OF UNCERTAIN BEHAVIOR: Primary | ICD-10-CM

## 2019-02-20 PROCEDURE — 11102 PR TANGENTIAL BIOPSY, SKIN, SINGLE LESION: ICD-10-PCS | Mod: S$PBB,,, | Performed by: DERMATOLOGY

## 2019-02-20 PROCEDURE — 99212 OFFICE O/P EST SF 10 MIN: CPT | Mod: PBBFAC,PO | Performed by: DERMATOLOGY

## 2019-02-20 PROCEDURE — 11102 TANGNTL BX SKIN SINGLE LES: CPT | Mod: 59,PBBFAC,PO | Performed by: DERMATOLOGY

## 2019-02-20 PROCEDURE — 17000 PR DESTRUCTION(LASER SURGERY,CRYOSURGERY,CHEMOSURGERY),PREMALIGNANT LESIONS,FIRST LESION: ICD-10-PCS | Mod: 59,S$PBB,, | Performed by: DERMATOLOGY

## 2019-02-20 PROCEDURE — 17000 DESTRUCT PREMALG LESION: CPT | Mod: PBBFAC,PO | Performed by: DERMATOLOGY

## 2019-02-20 PROCEDURE — 17003 DESTRUCT PREMALG LES 2-14: CPT | Mod: S$PBB,,, | Performed by: DERMATOLOGY

## 2019-02-20 PROCEDURE — 17000 DESTRUCT PREMALG LESION: CPT | Mod: 59,S$PBB,, | Performed by: DERMATOLOGY

## 2019-02-20 PROCEDURE — 99203 PR OFFICE/OUTPT VISIT, NEW, LEVL III, 30-44 MIN: ICD-10-PCS | Mod: 25,S$PBB,, | Performed by: DERMATOLOGY

## 2019-02-20 PROCEDURE — 17003 DESTRUCTION, PREMALIGNANT LESIONS; SECOND THROUGH 14 LESIONS: ICD-10-PCS | Mod: S$PBB,,, | Performed by: DERMATOLOGY

## 2019-02-20 PROCEDURE — 88305 TISSUE EXAM BY PATHOLOGIST: CPT | Performed by: PATHOLOGY

## 2019-02-20 PROCEDURE — 99203 OFFICE O/P NEW LOW 30 MIN: CPT | Mod: 25,S$PBB,, | Performed by: DERMATOLOGY

## 2019-02-20 PROCEDURE — 17003 DESTRUCT PREMALG LES 2-14: CPT | Mod: PBBFAC,PO | Performed by: DERMATOLOGY

## 2019-02-20 PROCEDURE — 88305 TISSUE SPECIMEN TO PATHOLOGY, DERMATOLOGY: ICD-10-PCS | Mod: 26,,, | Performed by: PATHOLOGY

## 2019-02-20 PROCEDURE — 11102 TANGNTL BX SKIN SINGLE LES: CPT | Mod: S$PBB,,, | Performed by: DERMATOLOGY

## 2019-02-20 PROCEDURE — 99999 PR PBB SHADOW E&M-EST. PATIENT-LVL II: CPT | Mod: PBBFAC,,, | Performed by: DERMATOLOGY

## 2019-02-20 PROCEDURE — 99999 PR PBB SHADOW E&M-EST. PATIENT-LVL II: ICD-10-PCS | Mod: PBBFAC,,, | Performed by: DERMATOLOGY

## 2019-02-20 NOTE — LETTER
February 20, 2019      Noemi Sol MD  [address] [place]  [place] [address]           [facility]  [address] [facility] [address] [place] [address]  Phone: [phone]  Fax: [phone]          Patient: Sergio Fagan   MR Number: [id number]   YOB: 1945   Date of Visit: 2/20/2019       Dear Dr. Noemi Sol:    Thank you for referring Sergio Fagan to me for evaluation. Attached you will find relevant portions of my assessment and plan of care.    If you have questions, please do not hesitate to call me. I look forward to following Sergio Fagan along with you.    Sincerely,    Janice Doshi MD    Enclosure  CC:  No Recipients    If you would like to receive this communication electronically, please contact [email or web address] or [phone] to request more information on Section 101 Link access.    For providers and/or their staff who would like to refer a patient to [facility], please contact us through our one-stop-shop provider referral line, [facility], at [phone].    If you feel you have received this communication in error or would no longer like to receive these types of communications, please e-mail [email or web address]

## 2019-02-20 NOTE — PROGRESS NOTES
Subjective:       Patient ID:  Sergio Fagan is a 73 y.o. male who presents for   Chief Complaint   Patient presents with    Lesion     Sergio Fagan a 72 yo male presents today with concerns of lesion to scalp for a duration of 3yrs with redness and scaling, denies spontaneous bleeding or itching, treating with OTC's    This is a high risk patient here to check for the development of new lesions.    Hx of skin cancer, unable to recall type, date (previously saw Dr. Surjit Pillai in [place])   Denies fx hx of melanoma    Past Medical History:  CABG: CAD (coronary artery disease)  No date: Cancer      Comment:  hard palate  2018: Cancer      Comment:  skin cancer.   No date: Colon polyps      Comment:  2007, hyperplastic  No date: Dementia  No date: DJD (degenerative joint disease) of cervical spine      Comment:  has seen Dr. Keyonna Light  No date: Fractures      Comment:  multiple  No date: Hand arthritis  No date: Hand pain, right  No date: Hyperlipidemia  No date: Nephrolithiasis          Review of Systems   Skin: Positive for dry skin and wears hat. Negative for itching, rash and daily sunscreen use.        Objective:    Physical Exam   Constitutional: He appears well-developed and well-nourished. No distress.   Neurological: He is alert and oriented to person, place, and time. He is not disoriented.   Psychiatric: He has a normal mood and affect.   Skin:   Areas Examined (abnormalities noted in diagram):   Scalp / Hair Palpated and Inspected  Head / Face Inspection Performed  Neck Inspection Performed  RUE Inspected  LUE Inspection Performed                      Diagram Legend     Erythematous scaling macule/papule c/w actinic keratosis       Vascular papule c/w angioma      Pigmented verrucoid papule/plaque c/w seborrheic keratosis      Yellow umbilicated papule c/w sebaceous hyperplasia      Irregularly shaped tan macule c/w lentigo     1-2 mm smooth white papules consistent with Milia      Movable  subcutaneous cyst with punctum c/w epidermal inclusion cyst      Subcutaneous movable cyst c/w pilar cyst      Firm pink to brown papule c/w dermatofibroma      Pedunculated fleshy papule(s) c/w skin tag(s)      Evenly pigmented macule c/w junctional nevus     Mildly variegated pigmented, slightly irregular-bordered macule c/w mildly atypical nevus      Flesh colored to evenly pigmented papule c/w intradermal nevus       Pink pearly papule/plaque c/w basal cell carcinoma      Erythematous hyperkeratotic cursted plaque c/w SCC      Surgical scar with no sign of skin cancer recurrence      Open and closed comedones      Inflammatory papules and pustules      Verrucoid papule consistent consistent with wart     Erythematous eczematous patches and plaques     Dystrophic onycholytic nail with subungual debris c/w onychomycosis     Umbilicated papule    Erythematous-base heme-crusted tan verrucoid plaque consistent with inflamed seborrheic keratosis     Erythematous Silvery Scaling Plaque c/w Psoriasis     See annotation          Assessment / Plan:      Pathology Orders:     Normal Orders This Visit    Tissue Specimen To Pathology, Dermatology     Questions:    Directional Terms:  Other(comment)    Clinical Information:  AK vs SCC    Specific Site:  right scalp        Neoplasm of uncertain behavior  -     Tissue Specimen To Pathology, Dermatology    Shave biopsy procedure note:    Shave biopsy performed after verbal consent including risk of infection, scar, recurrence, need for additional treatment of site. Area prepped with alcohol, anesthetized with approximately 1.0cc of 1% lidocaine with epinephrine. Lesional tissue shaved with razor blade. Hemostasis achieved with application of aluminum chloride followed by hyfrecation. No complications. Dressing applied. Wound care explained.    If biopsy positive for malignancy, refer to Dr. Moreno for Mohs surgery consultation.    AK (actinic keratosis)  Premalignant nature  discussed     Cryosurgery Procedure Note    Verbal consent from the patient is obtained including, but not limited to, risk of hypopigmentation/hyperpigmentation, scar, recurrence of lesion. The patient is aware of the precancerous quality and need for treatment of these lesions. Liquid nitrogen cryosurgery is applied to the 14 actinic keratoses, as detailed in the physical exam, to produce a freeze injury. The patient is aware that blisters may form and is instructed on wound care with gentle cleansing and use of vaseline ointment to keep moist until healed. The patient is supplied a handout on cryosurgery and is instructed to call if lesions do not completely resolve.      Multiple benign nevi  - There are no features concerning for malignancy on exam today.  - Continue to monitor with monthly self-skin exams.  - The patient counseled on ABCD of melanoma.  - The patient will contact a physician if they notice any changing lesions or have other concerns.    Angioma  This is a benign vascular lesion. Reassurance given. No treatment required.     History of nonmelanoma skin cancer  - The signs and symptoms of skin cancer were reviewed and the patient was advised to practice sun protection and sun avoidance, use daily sunscreen, and perform regular self skin exams.   - Patient declined FBSE/UBSE today. Will schedule follow up in 3 months for FBSE/UBSE             Follow-up in about 3 months (around 5/20/2019).     ADDENDUM:    1. 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  - THE TUMOR EXTENDS TO THE DEEP AND LATERAL BIOPSY MARGINS.  MICROSCOPIC DESCRIPTION: Sections show epidermis with full-thickness atypia, parakeratosis and variable  epidermal maturation. Dermal involvement is not seen.  Diagnosed by: Duran Acuna M.D.    Referral to Mohs given location

## 2019-02-21 ENCOUNTER — TELEPHONE (OUTPATIENT)
Dept: DERMATOLOGY | Facility: CLINIC | Age: 74
End: 2019-02-21

## 2019-02-21 NOTE — TELEPHONE ENCOUNTER
Spoke to lab regarding specimen, will take appropriate measures to correct situation    ----- Message from Jeremi Britton sent at 2/21/2019  8:58 AM CST -----  Contact: Yadira/ Pathology dept   Yadira/ Pathology dept need to speak with a nurse regarding they are missing the patient label for the specimen. Please call back at [phone]

## 2019-02-27 ENCOUNTER — TELEPHONE (OUTPATIENT)
Dept: DERMATOLOGY | Facility: CLINIC | Age: 74
End: 2019-02-27

## 2019-02-27 NOTE — TELEPHONE ENCOUNTER
----- Message from Janice Doshi MD sent at 2/27/2019  2:48 PM CST -----  Please call patient and inform of NMSC diagnosis (see below). Recommend patient to Dr. Moreno for Mohs surgery consultation given location. Picture in chart.    1. 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  - THE TUMOR EXTENDS TO THE DEEP AND LATERAL BIOPSY MARGINS.  MICROSCOPIC DESCRIPTION: Sections show epidermis with full-thickness atypia, parakeratosis and variable  epidermal maturation. Dermal involvement is not seen.  Diagnosed by: Duran Acuna M.D.

## 2019-03-08 ENCOUNTER — TELEPHONE (OUTPATIENT)
Dept: DERMATOLOGY | Facility: CLINIC | Age: 74
End: 2019-03-08

## 2019-03-08 NOTE — TELEPHONE ENCOUNTER
Pt notified of results of biopsy and reminded of appt with Dr Moreno, seems pt forgot the appt was made  ----- Message from Janice Doshi MD sent at 2/27/2019  2:48 PM CST -----  Please call patient and inform of NMSC diagnosis (see below). Recommend patient to Dr. Moreno for Mohs surgery consultation given location. Picture in chart.    1. 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  - THE TUMOR EXTENDS TO THE DEEP AND LATERAL BIOPSY MARGINS.  MICROSCOPIC DESCRIPTION: Sections show epidermis with full-thickness atypia, parakeratosis and variable  epidermal maturation. Dermal involvement is not seen.  Diagnosed by: Duran Acuna M.D.

## 2019-04-25 ENCOUNTER — INITIAL CONSULT (OUTPATIENT)
Dept: DERMATOLOGY | Facility: CLINIC | Age: 74
End: 2019-04-25
Payer: MEDICARE

## 2019-04-25 VITALS
DIASTOLIC BLOOD PRESSURE: 67 MMHG | BODY MASS INDEX: 24.44 KG/M2 | HEIGHT: 69 IN | SYSTOLIC BLOOD PRESSURE: 128 MMHG | HEART RATE: 55 BPM | WEIGHT: 165 LBS

## 2019-04-25 DIAGNOSIS — D04.4 SQUAMOUS CELL CARCINOMA IN SITU (SCCIS) OF SCALP: Primary | ICD-10-CM

## 2019-04-25 PROCEDURE — 99214 OFFICE O/P EST MOD 30 MIN: CPT | Mod: S$PBB,,, | Performed by: DERMATOLOGY

## 2019-04-25 PROCEDURE — 99999 PR PBB SHADOW E&M-EST. PATIENT-LVL III: ICD-10-PCS | Mod: PBBFAC,,, | Performed by: DERMATOLOGY

## 2019-04-25 PROCEDURE — 99999 PR PBB SHADOW E&M-EST. PATIENT-LVL III: CPT | Mod: PBBFAC,,, | Performed by: DERMATOLOGY

## 2019-04-25 PROCEDURE — 99214 PR OFFICE/OUTPT VISIT, EST, LEVL IV, 30-39 MIN: ICD-10-PCS | Mod: S$PBB,,, | Performed by: DERMATOLOGY

## 2019-04-25 PROCEDURE — 99213 OFFICE O/P EST LOW 20 MIN: CPT | Mod: PBBFAC,PO | Performed by: DERMATOLOGY

## 2019-04-25 NOTE — LETTER
April 27, 2019      Janice Doshi MD  [address] [facility] [address] [place] [address]           [facility]  [address] [facility] [address] [place] [address]  Phone: [phone]          Patient: Sergio Fagan   MR Number: [id number]   YOB: 1945   Date of Visit: 4/25/2019       Dear Dr. Janice Doshi:    Thank you for referring Sergio Fagan to me for evaluation. Attached you will find relevant portions of my assessment and plan of care.    If you have questions, please do not hesitate to call me. I look forward to following Sergio Fagan along with you.    Sincerely,    Sharif Moreno MD    Enclosure  CC:  No Recipients    If you would like to receive this communication electronically, please contact [email or web address] or [phone] to request more information on [employer] Link access.    For providers and/or their staff who would like to refer a patient to [facility], please contact us through our one-stop-shop provider referral line, [facility], at [phone].    If you feel you have received this communication in error or would no longer like to receive these types of communications, please e-mail [email or web address]

## 2019-04-25 NOTE — PROGRESS NOTES
ALLERGIES:  No known allergies    CHIEF COMPLAINT:  This 73 y.o. male comes for evaluation for Mohs' Micrographic Surgery, Fresh Tissue Technique, for treatment of a biopsy-proven 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 Consultation requested by Janice Doshi M.D..    HISTORY OF PRESENT ILLNESS:   Location: right scalp  Duration: 2 weeks or mor  Quality: persistent  Context: status post biopsy by Janice Doshi M.D.; path = squamous cell carcinoma in situ; pathology accession #[id number],  Pathology [facility]    Prior Treatment: none    See also the handwritten notes/diagrams scanned to chart for additional details.    Defibrillator: No  Pacemaker: No  Artificial heart valves: No  Artificial joints: No    REVIEW OF SYSTEMS:   General: general health good  Skin: has previous history of skin cancer(s)  CV: has no hypertension, no artificial valves, has no chest pain; post coronary artery bypass grafting 2000  Resp: has no shortness of breath  Endo: has no diabetes  Hem/Lymph: taking prescribed anticoagulants-ASPIRIN 81 mg, has easy bruising/bleeding  Allergy/Immuno: has no allergies as noted above  GI: has no history of hepatitis  MS: as noted above     PAST MEDICAL HISTORY:  Past Medical History:   Diagnosis Date    CAD (coronary artery disease) CABG    Cancer     hard palate    Cancer 2018    skin cancer.     Colon polyps     2007, hyperplastic    Dementia     DJD (degenerative joint disease) of cervical spine     has seen Dr. Keyonna Light    Fractures     multiple    Hand arthritis     Hand pain, right     Hyperlipidemia     Nephrolithiasis        PAST SURGICAL HISTORY:  Past Surgical History:   Procedure Laterality Date    BACK SURGERY      CARDIAC SURGERY  2004    cervical disckectomy      COLONOSCOPY N/A 4/18/2012    [facility]    CORONARY ARTERY BYPASS GRAFT      x3    occipital lipoma      oral cancer      hard palate    PPH      removal  renal stone      1/16    SHOULDER ARTHROSCOPY      SHOULDER SURGERY          SOCIAL HISTORY:  Dependencies: smoking status as noted below  Social History     Tobacco Use    Smoking status: Light Tobacco Smoker     Types: Cigars    Smokeless tobacco: Former User     Quit date: 4/9/2000    Tobacco comment: quit 2002, but restarted   Substance Use Topics    Alcohol use: No    Drug use: No       PERTINENT MEDICATIONS:  See medications list.    Current Outpatient Medications:     ascorbic acid (VITAMIN C) 500 MG tablet, Every morning, Disp: , Rfl:     aspirin 81 MG chewable tablet, , Disp: , Rfl:     atenolol (TENORMIN) 25 MG tablet, Take 0.5 tablets (12.5 mg total) by mouth every morning., Disp: 45 tablet, Rfl: 3    buPROPion (WELLBUTRIN SR) 150 MG TBSR 12 hr tablet, Take 1 tablet (150 mg total) by mouth 2 (two) times daily., Disp: 60 tablet, Rfl: 2    multivitamin (ONE DAILY MULTIVITAMIN) per tablet, Take 1 tablet by mouth once daily., Disp: , Rfl:     rosuvastatin (CRESTOR) 10 MG tablet, Take 1 tablet (10 mg total) by mouth once daily., Disp: 90 tablet, Rfl: 3    traZODone (DESYREL) 50 MG tablet, TAKE ONE TABLET BY MOUTH EVERY NIGHT AS NEEDED, Disp: 90 tablet, Rfl: 3    ALLERGIES:  No known allergies    EXAM:  See also the handwritten notes/diagrams scanned to chart for additional details.  Constitutional  General appearance: well-developed, well-nourished, well-kempt older white male    Eyes  Inspection of conjunctivae and lids reveals no abnormalities; sclerae anicteric  Neurologic/Psychiatric  Alert,  normal orientation to time, place, person  Normal mood and affect with no evidence of depression, anxiety, agitation  Skin: see photo(s)  Head: background moderate solar damage to exposed areas of skin; in addition, inspection/palpation reveals an ill-defined, approximately 2.5 cm area of irregular scaling and erythema with a central pink, depressed scar on the right scalp  which feels freely movable  over the underlying tissues on palpation; site(s) confirmed by reference to the photograph(s) in the chart taken at the time of the biopsy/biopsies by the referring physician and he confirmed this as the site of the prior biopsy; there are other scattered rough, hyperkeratotic, irregularly-shaped, irregularly-surfaced, yellowish papules and/or plaques with mild surrounding erythema but no notable underlying induration, which are clinically typical in appearance for actinic keratoses on his scalp    Neck: examination reveals moderate chronic solar damage  Right upper extremity: examination reveals marked chronic solar damage; marked ecchymosis/ecchymoses and dyschromia  Left upper extremity: examination reveals marked chronic solar damage; marked ecchymosis/ecchymoses and dyschromia    Photo, this visit      Photo from time of biopsy      ASSESSMENT: 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 In particular, the discussion included, but was not limited to, the following:    One alternative at this point would be to defer further treatment and observe the lesion. With small skin cancers of this kind, it is possible that a biopsy can be sufficient to definitively treat a small skin cancer of this kind. Alternatively, some skin cancers are slow growing and do not require immediate treatment. The potential advantage of this choice would be to avoid the need for possibly unnecessary additional surgery. Among the potential  disadvantages of this would be the possibility of enlargement of the lesion, more extensive spread of the lesion or recurrence at a later date, which might necessitate a larger and more complex surgery.    We also discussed possible treatment with topical medication, including Efudex and imiquimod.  This would involve application of the medication for several weeks.  Local irritation would result.  The potential disadvantages of this is that it fails to clear the skin cancer.  The potential advantages that it could treat the skin cancer, and the other areas on his scalp consistent with actinic keratoses.    Radiation treatment can be an effective treatment for this type of skin cancer. The usual course of treatment is every weekday for several weeks. Local irritation will result from treatment, although no systemic side effects are expected. The potential advantage of radiation treatment is that it avoids the need for surgery. Among the disadvantages of radiation treatment are the length of treatment, the local inflammatory response, the absence of pathologic confirmation of the removal of the skin cancer, a possible increased risk of additional skin cancer in the treated area in later years, and a somewhat increased risk of recurrence at a later date.     Excisional surgery can be an effective treatment for this type of skin cancer. This would involve excision of the lesion with margin evaluation by submitting the specimen to a pathologist for either immediate marginal assessment via frozen section processing, or delayed marginal assessment by fixed-tissue processing. The potential advantage of this technique is that it offers a way of treating the lesion with some degree of histologic confirmation of tumor removal. Among the disadvantages of this treatment are the possible need for re-excision if marginal involvement is identified, a somewhat greater likelihood of recurrence as compared to Mohs' surgery because of  the less comprehensive margin evaluation inherent in the technique, and the general potential risks of surgery, including allergic reactions to the anesthetic and other materials used, infection, injury to nerves in the area with consequent loss of sensation or muscle function, and scarring or distortion of surrounding structures.    Mohs' surgery is a very effective treatment for this type of skin cancer. The potential advantage of Mohs' surgery is that this technique offers the greatest possible certainty of knowing that the skin cancer has been completely removed, with the removal of the least amount of normal tissue. The potential disadvantages of Mohs' surgery include the duration of the surgery, the possible need for a separate surgery for reconstruction following tumor removal, and scarring as a result. In addition, general potential risks of surgery as noted above also apply to treatment via Mohs' surgery.    In light of the nature of this tumor and the location on the scalp in an area of increased risk of recurrence,  Mohs' micrographic surgery was thought to be the most appropriate management choice, and this diagnosis is appropriate for treatment by Mohs' micrographic surgery.     Sufficient time was available for questions, and all questions were answered to his satisfaction. He fully understands the aims, risks, alternatives, and possible complications, and has elected to proceed with the surgery, and verbally consented to do so. The procedure will be scheduled in the near future.    Routine pre-op instructions were given to him.    --------------------------------------  Note: Some or all of this note may have been generated using voice recognition software. There may be voice recognition errors including grammatical and/or spelling errors found in the text. Attempts were made to correct these errors prior to signature.

## 2019-07-01 ENCOUNTER — TELEPHONE (OUTPATIENT)
Dept: DERMATOLOGY | Facility: CLINIC | Age: 74
End: 2019-07-01

## 2019-07-01 NOTE — TELEPHONE ENCOUNTER
----- Message from Arti Powers sent at 7/1/2019  7:50 AM CDT -----  Type: Appointment Request    Caller is requesting to change appt.    Name of Caller: patient  When is the first available appointment?  na  Symptoms:  na  Best Call Back Number:  [phone]  Additional Information:  Has appt Monday 07 08 19 that he is needing to reschedule/please call

## 2019-08-05 PROBLEM — Z79.899 ON STATIN THERAPY: Status: ACTIVE | Noted: 2019-08-05

## 2019-08-05 PROBLEM — F32.9 REACTIVE DEPRESSION: Status: ACTIVE | Noted: 2019-08-05

## 2019-08-05 PROBLEM — Z79.02 ANTIPLATELET OR ANTITHROMBOTIC LONG-TERM USE: Status: ACTIVE | Noted: 2019-08-05

## 2019-08-05 PROBLEM — Z85.819: Status: ACTIVE | Noted: 2019-08-05

## 2019-08-12 ENCOUNTER — TELEPHONE (OUTPATIENT)
Dept: DERMATOLOGY | Facility: CLINIC | Age: 74
End: 2019-08-12

## 2019-08-12 NOTE — TELEPHONE ENCOUNTER
Patient called to see what time was his surgery appointment, I gave him the time and told him that I would send out another copy of the appointment paper and also his pre-op instructions. Madeline

## 2019-08-20 ENCOUNTER — OFFICE VISIT (OUTPATIENT)
Dept: DERMATOLOGY | Facility: CLINIC | Age: 74
End: 2019-08-20
Payer: MEDICARE

## 2019-08-20 VITALS — WEIGHT: 163 LBS | BODY MASS INDEX: 24.14 KG/M2 | HEIGHT: 69 IN

## 2019-08-20 DIAGNOSIS — D48.5 NEOPLASM OF UNCERTAIN BEHAVIOR OF SKIN: Primary | ICD-10-CM

## 2019-08-20 DIAGNOSIS — L82.0 INFLAMED SEBORRHEIC KERATOSIS: ICD-10-CM

## 2019-08-20 PROCEDURE — 99999 PR PBB SHADOW E&M-EST. PATIENT-LVL III: CPT | Mod: PBBFAC,,, | Performed by: DERMATOLOGY

## 2019-08-20 PROCEDURE — 99999 PR PBB SHADOW E&M-EST. PATIENT-LVL III: ICD-10-PCS | Mod: PBBFAC,,, | Performed by: DERMATOLOGY

## 2019-08-20 PROCEDURE — 99499 NO LOS: ICD-10-PCS | Mod: S$PBB,,, | Performed by: DERMATOLOGY

## 2019-08-20 PROCEDURE — 17110 DESTRUCTION B9 LES UP TO 14: CPT | Mod: S$PBB,,, | Performed by: DERMATOLOGY

## 2019-08-20 PROCEDURE — 99499 UNLISTED E&M SERVICE: CPT | Mod: S$PBB,,, | Performed by: DERMATOLOGY

## 2019-08-20 PROCEDURE — 99213 OFFICE O/P EST LOW 20 MIN: CPT | Mod: PBBFAC,PO | Performed by: DERMATOLOGY

## 2019-08-20 PROCEDURE — 11102 PR TANGENTIAL BIOPSY, SKIN, SINGLE LESION: ICD-10-PCS | Mod: S$PBB,59,, | Performed by: DERMATOLOGY

## 2019-08-20 PROCEDURE — 88305 TISSUE EXAM BY PATHOLOGIST: CPT | Performed by: PATHOLOGY

## 2019-08-20 PROCEDURE — 17110 DESTRUCTION B9 LES UP TO 14: CPT | Mod: 59,PBBFAC,PO | Performed by: DERMATOLOGY

## 2019-08-20 PROCEDURE — 11102 TANGNTL BX SKIN SINGLE LES: CPT | Mod: S$PBB,59,, | Performed by: DERMATOLOGY

## 2019-08-20 PROCEDURE — 17110 PR DESTRUCTION BENIGN LESIONS UP TO 14: ICD-10-PCS | Mod: S$PBB,,, | Performed by: DERMATOLOGY

## 2019-08-20 PROCEDURE — 11102 TANGNTL BX SKIN SINGLE LES: CPT | Mod: PBBFAC,PO | Performed by: DERMATOLOGY

## 2019-08-20 PROCEDURE — 88305 TISSUE SPECIMEN TO PATHOLOGY, DERMATOLOGY: ICD-10-PCS | Mod: 26,,, | Performed by: PATHOLOGY

## 2019-08-20 NOTE — PROGRESS NOTES
Subjective:       Patient ID:  Sergio Fagan is a 73 y.o. male who presents for   Chief Complaint   Patient presents with    Lesion     Last OV with  02/20/2019  74 y/o M presents for evaluation of 3 lesions on neck x 1 year.    1 lesion is very tender, growing and irritated by his clothing.  No bleeding, no prior treatments   The other 2 are itchy and irritated by clothing. No priot tx    Derm Hx: SCCIS on his scalp (scheduled for Mohs 9/3/19)    .  Past Medical History:   Diagnosis Date    CAD (coronary artery disease) CABG    Cancer     hard palate    Cancer 2018    skin cancer.     Colon polyps     2007, hyperplastic    Dementia     DJD (degenerative joint disease) of cervical spine     has seen Dr. Keyonna Light    Fractures     multiple    Hand arthritis     Hand pain, right     Hyperlipidemia     Nephrolithiasis      Review of Systems   Constitutional: Negative for fever, chills and fatigue.   Skin: Positive for dry skin, daily sunscreen use, activity-related sunscreen use and wears hat. Negative for itching and rash.        Objective:    Physical Exam   Constitutional: He appears well-developed and well-nourished.   Neurological: He is alert and oriented to person, place, and time.   Psychiatric: He has a normal mood and affect.   Skin:   Areas Examined (abnormalities noted in diagram):   Head / Face Inspection Performed  Neck Inspection Performed                  Diagram Legend     Erythematous scaling macule/papule c/w actinic keratosis       Vascular papule c/w angioma      Pigmented verrucoid papule/plaque c/w seborrheic keratosis      Yellow umbilicated papule c/w sebaceous hyperplasia      Irregularly shaped tan macule c/w lentigo     1-2 mm smooth white papules consistent with Milia      Movable subcutaneous cyst with punctum c/w epidermal inclusion cyst      Subcutaneous movable cyst c/w pilar cyst      Firm pink to brown papule c/w dermatofibroma      Pedunculated fleshy  papule(s) c/w skin tag(s)      Evenly pigmented macule c/w junctional nevus     Mildly variegated pigmented, slightly irregular-bordered macule c/w mildly atypical nevus      Flesh colored to evenly pigmented papule c/w intradermal nevus       Pink pearly papule/plaque c/w basal cell carcinoma      Erythematous hyperkeratotic cursted plaque c/w SCC      Surgical scar with no sign of skin cancer recurrence      Open and closed comedones      Inflammatory papules and pustules      Verrucoid papule consistent consistent with wart     Erythematous eczematous patches and plaques     Dystrophic onycholytic nail with subungual debris c/w onychomycosis     Umbilicated papule    Erythematous-base heme-crusted tan verrucoid plaque consistent with inflamed seborrheic keratosis     Erythematous Silvery Scaling Plaque c/w Psoriasis     See annotation      Assessment / Plan:      Pathology Orders:     Normal Orders This Visit    Tissue Specimen To Pathology, Dermatology     Questions:    Directional Terms:  Other(comment)    Clinical Information:  r/o HAK vs SCC    Specific Site:  L base of neck        Neoplasm of uncertain behavior of skin  -     Tissue Specimen To Pathology, Dermatology  Shave biopsy procedure note:    Shave biopsy performed after verbal consent including risk of infection, scar, recurrence, need for additional treatment of site. Area prepped with alcohol, anesthetized with approximately 1.0cc of 1% lidocaine with epinephrine. Lesional tissue shaved with razor blade. Hemostasis achieved with application of aluminum chloride followed by hyfrecation. No complications. Dressing applied. Wound care explained.      Inflamed seborrheic keratosis  Cryosurgery procedure note:    Verbal consent from the patient is obtained. Liquid nitrogen cryosurgery is applied to 2 lesions to produce a freeze injury. The patient is aware that blisters may form and is instructed on wound care with gentle cleansing and use of vaseline  ointment to keep moist until healed. The patient is supplied a handout on cryosurgery and is instructed to call if lesions do not completely resolve.           Follow up for pending Pathology.

## 2019-09-02 NOTE — PROGRESS NOTES
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    HPI:   Location: right scalp vertex  Duration: months  Timing: I last saw him 4 months ago  Context: prior biopsy showed squamous cell carcinoma in situ; see prior note    Review of Systems:  CV: +++ HAS AN ARTIFICIAL VALVE +++ ; aortic valve; thinks it is a porcine valve and was placed around 2005  However, I cannot find any documentation of this in the cardiology notes from his previous visits  Skin: has a tender bump to mid anterior scalp x several weeks or more    Medications: see list    Current Outpatient Medications:     ascorbic acid (VITAMIN C) 500 MG tablet, Every morning, Disp: , Rfl:     aspirin 81 MG chewable tablet, , Disp: , Rfl:     atenolol (TENORMIN) 25 MG tablet, Take 0.5 tablets (12.5 mg total) by mouth every morning., Disp: 45 tablet, Rfl: 3    escitalopram oxalate (LEXAPRO) 5 MG Tab, Take 1 tablet (5 mg total) by mouth once daily., Disp: 30 tablet, Rfl: 11    multivitamin (ONE DAILY MULTIVITAMIN) per tablet, Take 1 tablet by mouth once daily., Disp: , Rfl:     rosuvastatin (CRESTOR) 40 MG Tab, Take 1 tablet (40 mg total) by mouth once daily., Disp: 90 tablet, Rfl: 3    traZODone (DESYREL) 50 MG tablet, TAKE ONE TABLET BY MOUTH EVERY NIGHT AS NEEDED, Disp: 90 tablet, Rfl: 3    Review of patient's allergies indicates:   Allergen Reactions    No known allergies        Past Medical History:   Diagnosis Date    CAD (coronary artery disease) CABG    Cancer     hard palate    Cancer 2018    skin cancer.     Colon polyps     2007, hyperplastic    Dementia     DJD (degenerative joint disease) of cervical spine     has seen Dr. Keyonna Light    Fractures     multiple    Hand arthritis     Hand pain, right     Hyperlipidemia      "Nephrolithiasis        Past Surgical History:   Procedure Laterality Date    BACK SURGERY      CARDIAC SURGERY  2004    cervical disckectomy      COLONOSCOPY N/A 4/18/2012    Per[facility]zan [facility]    CORONARY ARTERY BYPASS GRAFT      x3    occipital lipoma      oral cancer      hard palate    PPH      removal renal stone      1/16    SHOULDER ARTHROSCOPY      SHOULDER SURGERY         Social History     Socioeconomic History    Marital status: Single     Spouse name: Not on file    Number of children: 2    Years of education: Not on file    Highest education level: Not on file   Occupational History    Not on file   Social Needs    Financial resource strain: Not on file    Food insecurity:     Worry: Not on file     Inability: Not on file    Transportation needs:     Medical: Not on file     Non-medical: Not on file   Tobacco Use    Smoking status: Light Tobacco Smoker     Types: Cigars    Smokeless tobacco: Former User     Quit date: 4/9/2000    Tobacco comment: quit 2002, but restarted   Substance and Sexual Activity    Alcohol use: No    Drug use: No    Sexual activity: Not Currently   Lifestyle    Physical activity:     Days per week: Not on file     Minutes per session: Not on file    Stress: Not on file   Relationships    Social connections:     Talks on phone: Not on file     Gets together: Not on file     Attends Jain service: Not on file     Active member of club or organization: Not on file     Attends meetings of clubs or organizations: Not on file     Relationship status: Not on file   Other Topics Concern    Not on file   Social History Narrative    Not on file       Vitals:    09/03/19 0733   BP: 136/80   Pulse: (!) 55   Weight: 72.6 kg (160 lb)   Height: 5' 9" (1.753 m)        EXAM:  Constitutional  - General appearance: well-developed, well-nourished, well-kempt older white male    Eyes  - Conjunctivae and lids - no abnormalities; sclerae " anicteric  Neurologic/Psychiatric  - Orientation - Alert,  normal orientation to time, place, person  - Mood and affect - Normal mood and affect with no evidence of depression, anxiety, agitation; speech is normal  Skin:   - Scalp: on the right scalp there is an approximately 2 x 2.5 cm area of erythema, erosion, sclerotic changes and focal scaling, consistent with squamous cell carcinoma in situ   On the scalp vertex there is an approximately 1.1 x 2.0 cm area of nodularity/induration with an overlying eschar  There are marked chronic solar changes with multiple area of rough, hyperkeratotic, irregularly-shaped, irregularly-surfaced, yellowish papules and/or plaques with mild surrounding erythema but no notable underlying induration, which are clinically typical in appearance for actinic keratoses      Photos today:           Assessment:  status post biopsy, 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 This discussion included but was not limited to the following:    I reviewed with him the nature of the skin cancer(s) on his right scalp. I explained that this is not an issue which requires immediate/urgent treatment.     I discussed with him the clinically suspicious nature of the lesion on the mid scalp vertex.     I discussed with him options, including proceeding with treatment via Mohs' surgery to the right scalp lesion today with biopsy of the mid scalp lesion, versus deferring treatment of the right scalp lesion and proceeding with biopsy of the mid scalp lesion, so that treatment can be accomplished to both at the same time if indicated.  I discussed the risks and benefits of each of these alternatives with him.  Sufficient time was available for questions,  and all questions were answered to his satisfaction.    We will defer the surgery to the right scalp site and proceed with biopsy of the mid scalp lesion today.  He is to follow up in 2 weeks to review the biopsy results and coordinate further treatment as indicated.    We also discussed possible options for treatment of the actinic keratoses on his scalp.  We will defer this for the time being.      See the procedure note below.    PROCEDURE NOTE: SHAVE BIOPSY    The diagnosis and management options and risk, benefits and alternatives were discussed with the patient. All questions were answered. Verbal consent was obtained.    Site: mid scalp  Indication: clinically suspicious lesion; rule out malignancy  Prep: Alcohol  Anesthesia: 1% lidocaine with epi 1:100K, less than 2 mL  Shave biopsy performed  Hemostasis with electrodesiccation  Dressed with petrolatum and bandaid  Specimen placed in formalin to be submitted to pathology    Routine care instructions given  Followup: 2 weeks to discuss biopsy results and coordinate any further treatment indicated    -----------------------------------------------------------  Note: Some or all of this note may have been generated using voice recognition software. There may be voice recognition errors including grammatical and/or spelling errors found in the text. Attempts were made to correct these errors prior to signature.

## 2019-09-03 ENCOUNTER — PROCEDURE VISIT (OUTPATIENT)
Dept: DERMATOLOGY | Facility: CLINIC | Age: 74
End: 2019-09-03
Payer: MEDICARE

## 2019-09-03 VITALS
HEIGHT: 69 IN | WEIGHT: 160 LBS | HEART RATE: 55 BPM | BODY MASS INDEX: 23.7 KG/M2 | DIASTOLIC BLOOD PRESSURE: 80 MMHG | SYSTOLIC BLOOD PRESSURE: 136 MMHG

## 2019-09-03 DIAGNOSIS — D48.5 NEOPLASM OF UNCERTAIN BEHAVIOR OF SKIN: ICD-10-CM

## 2019-09-03 DIAGNOSIS — D04.4 SQUAMOUS CELL CARCINOMA IN SITU (SCCIS) OF SCALP: ICD-10-CM

## 2019-09-03 PROCEDURE — 88305 TISSUE SPECIMEN TO PATHOLOGY, DERMATOLOGY: ICD-10-PCS | Mod: 26,,, | Performed by: PATHOLOGY

## 2019-09-03 PROCEDURE — 11102 TANGNTL BX SKIN SINGLE LES: CPT | Mod: PBBFAC,PO | Performed by: DERMATOLOGY

## 2019-09-03 PROCEDURE — 99212 PR OFFICE/OUTPT VISIT, EST, LEVL II, 10-19 MIN: ICD-10-PCS | Mod: 25,S$PBB,, | Performed by: DERMATOLOGY

## 2019-09-03 PROCEDURE — 11102 TANGNTL BX SKIN SINGLE LES: CPT | Mod: S$PBB,,, | Performed by: DERMATOLOGY

## 2019-09-03 PROCEDURE — 99212 OFFICE O/P EST SF 10 MIN: CPT | Mod: 25,S$PBB,, | Performed by: DERMATOLOGY

## 2019-09-03 PROCEDURE — 11102 PR TANGENTIAL BIOPSY, SKIN, SINGLE LESION: ICD-10-PCS | Mod: S$PBB,,, | Performed by: DERMATOLOGY

## 2019-09-03 PROCEDURE — 88305 TISSUE EXAM BY PATHOLOGIST: CPT | Performed by: PATHOLOGY

## 2019-09-04 ENCOUNTER — TELEPHONE (OUTPATIENT)
Dept: DERMATOLOGY | Facility: CLINIC | Age: 74
End: 2019-09-04

## 2019-09-04 NOTE — TELEPHONE ENCOUNTER
----- Message from Jessica Vale sent at 9/4/2019 12:59 PM CDT -----  Contact: pt   Type:  Test Results    Who Called: pt   Name of Test (Lab/Mammo/Etc): biopsy results   Date of Test: 9/3/2019  Ordering Provider: Josh   Where the test was performed: [facility]   Would the patient rather a call back or a response via [facility]?call back   Best Call Back Number: [phone]  Additional Information:  None    HUMBLE

## 2019-09-05 ENCOUNTER — TELEPHONE (OUTPATIENT)
Dept: DERMATOLOGY | Facility: CLINIC | Age: 74
End: 2019-09-05

## 2019-09-06 ENCOUNTER — TELEPHONE (OUTPATIENT)
Dept: DERMATOLOGY | Facility: CLINIC | Age: 74
End: 2019-09-06

## 2019-09-06 NOTE — TELEPHONE ENCOUNTER
Pt contacted w/ result  ----- Message from Gabby Moore sent at 9/6/2019  2:22 PM CDT -----  Contact: Self  Type:  Test Results    Who Called:  patient  Name of Test (Lab/Mammo/Etc):  Biopsy  Date of Test:  8/20  Ordering Provider:  Dr Bell  Where the test was performed:  [facility]  Best Call Back Number:  [phone] (home)   Additional Information:  na

## 2019-09-10 ENCOUNTER — TELEPHONE (OUTPATIENT)
Dept: DERMATOLOGY | Facility: CLINIC | Age: 74
End: 2019-09-10

## 2019-09-10 NOTE — TELEPHONE ENCOUNTER
----- Message from Sharif Moreno MD sent at 9/7/2019 12:54 PM CDT -----  This was another skin cancer as expected.   Madeline, ask me please; we need to coordinate treatment.  FINAL PATHOLOGIC DIAGNOSIS  1. Skin, mid scalp, shave biopsy:  - INVASIVE SQUAMOUS CELL CARCINOMA.  - THE TUMOR EXTENDS TO THE DEEP BIOPSY MARGIN.  Diagnosed by: Duran Acuna M.D.  (Electronically Signed: 2019-09-06 12:03:58)

## 2019-09-13 ENCOUNTER — PROCEDURE VISIT (OUTPATIENT)
Dept: DERMATOLOGY | Facility: CLINIC | Age: 74
End: 2019-09-13
Payer: MEDICARE

## 2019-09-13 VITALS
DIASTOLIC BLOOD PRESSURE: 76 MMHG | HEART RATE: 57 BPM | HEIGHT: 69 IN | BODY MASS INDEX: 24.44 KG/M2 | SYSTOLIC BLOOD PRESSURE: 140 MMHG | WEIGHT: 165 LBS

## 2019-09-13 DIAGNOSIS — D04.4 SQUAMOUS CELL CARCINOMA IN SITU (SCCIS) OF SCALP: Primary | ICD-10-CM

## 2019-09-13 DIAGNOSIS — C44.42 SQUAMOUS CELL CARCINOMA OF SCALP: ICD-10-CM

## 2019-09-13 PROCEDURE — 17311 MOHS 1 STAGE H/N/HF/G: CPT | Mod: S$PBB,,, | Performed by: DERMATOLOGY

## 2019-09-13 PROCEDURE — 12034 INTMD RPR S/TR/EXT 7.6-12.5: CPT | Mod: S$PBB,59,, | Performed by: DERMATOLOGY

## 2019-09-13 PROCEDURE — 99499 NO LOS: ICD-10-PCS | Mod: S$PBB,,, | Performed by: DERMATOLOGY

## 2019-09-13 PROCEDURE — 17311 MOHS 1 STAGE H/N/HF/G: CPT | Mod: PBBFAC,PO | Performed by: DERMATOLOGY

## 2019-09-13 PROCEDURE — 12034 PR LAYR CLOS WND TRUNK,ARM,LEG 7.6-12.5 CM: ICD-10-PCS | Mod: S$PBB,59,, | Performed by: DERMATOLOGY

## 2019-09-13 PROCEDURE — 12034 INTMD RPR S/TR/EXT 7.6-12.5: CPT | Mod: PBBFAC,PO | Performed by: DERMATOLOGY

## 2019-09-13 PROCEDURE — 17312: ICD-10-PCS | Mod: S$PBB,,, | Performed by: DERMATOLOGY

## 2019-09-13 PROCEDURE — 17312 MOHS ADDL STAGE: CPT | Mod: S$PBB,,, | Performed by: DERMATOLOGY

## 2019-09-13 PROCEDURE — 17312 MOHS ADDL STAGE: CPT | Mod: PBBFAC,PO | Performed by: DERMATOLOGY

## 2019-09-13 PROCEDURE — 99499 UNLISTED E&M SERVICE: CPT | Mod: S$PBB,,, | Performed by: DERMATOLOGY

## 2019-09-13 PROCEDURE — 17311: ICD-10-PCS | Mod: S$PBB,,, | Performed by: DERMATOLOGY

## 2019-09-13 RX ORDER — DOXYCYCLINE 100 MG/1
100 CAPSULE ORAL EVERY 12 HOURS
Qty: 20 CAPSULE | Refills: 0 | Status: ON HOLD | OUTPATIENT
Start: 2019-09-13 | End: 2019-10-30 | Stop reason: CLARIF

## 2019-09-13 NOTE — PROGRESS NOTES
Prior photo(s) of site(s) to confirm location(s):                ALLERGIES:   No known allergies      Current Outpatient Medications:     ascorbic acid (VITAMIN C) 500 MG tablet, Every morning, Disp: , Rfl:     aspirin 81 MG chewable tablet, , Disp: , Rfl:     atenolol (TENORMIN) 25 MG tablet, Take 0.5 tablets (12.5 mg total) by mouth every morning., Disp: 45 tablet, Rfl: 3    escitalopram oxalate (LEXAPRO) 5 MG Tab, Take 1 tablet (5 mg total) by mouth once daily., Disp: 30 tablet, Rfl: 11    ibuprofen (ADVIL,MOTRIN) 600 MG tablet, Take 1 tablet (600 mg total) by mouth every 6 (six) hours as needed for Pain., Disp: 20 tablet, Rfl: 0    lidocaine (LIDODERM) 5 %, Place 1 patch onto the skin once daily. Remove & Discard patch within 12 hours or as directed by MD, Disp: 30 patch, Rfl: 0    multivitamin (ONE DAILY MULTIVITAMIN) per tablet, Take 1 tablet by mouth once daily., Disp: , Rfl:     rosuvastatin (CRESTOR) 40 MG Tab, Take 1 tablet (40 mg total) by mouth once daily., Disp: 90 tablet, Rfl: 3    traZODone (DESYREL) 50 MG tablet, TAKE ONE TABLET BY MOUTH EVERY NIGHT AS NEEDED, Disp: 90 tablet, Rfl: 3  -------------------------------------------------------------  PROCEDURE: Mohs' Micrographic Surgery to two sites    FIRST SITE: mid scalp    INDICATION: invasive squamous cell carcinoma in an area at increased risk of recurrence    CASE NUMBER: [id number]      ANESTHETIC: 7.5 mL 1% Lidocaine with Epinephrine 1:100,000    SURGICAL PREP: Ethanol and Hibiclens    SURGEON: Sharif Moreno MD    ASSISTANTS: Canelo Mccarthy CST     PREOPERATIVE DIAGNOSIS: invasive squamous cell carcinoma     POSTOPERATIVE DIAGNOSIS: invasive squamous cell carcinoma     PATHOLOGIC DIAGNOSIS: invasive squamous cell carcinoma     STAGES OF MOHS' SURGERY PERFORMED: two    TUMOR-FREE PLANE ACHIEVED: yes    HEMOSTASIS: Hyfrecation     SPECIMENS: two (one in stage A, one in stage B)    INITIAL LESION SIZE: 1.3 x 1.4 cm    FINAL DEFECT  SIZE: 1.8 x 2.0 cm    WOUND REPAIR/DISPOSITION: see below    ---------------------------------------    SECOND SITE: right scalp    INDICATION: squamous cell carcinoma in situ in an area at increased risk of recurrence    CASE NUMBER: [id number]      ANESTHETIC: as above    SURGICAL PREP: as above    SURGEON: Sharif Moreno MD    ASSISTANTS: as above    PREOPERATIVE DIAGNOSIS: squamous cell carcinoma in situ     POSTOPERATIVE DIAGNOSIS: squamous cell carcinoma in situ     PATHOLOGIC DIAGNOSIS: squamous cell carcinoma in situ     STAGES OF MOHS' SURGERY PERFORMED: two    TUMOR-FREE PLANE ACHIEVED: yes    HEMOSTASIS: Hyfrecation     SPECIMENS: three (two in stage A, one in stage B)    INITIAL LESION SIZE: 1.4 x 2.7 cm    FINAL DEFECT SIZE: 1.6 x 2.8 cm    WOUND REPAIR/DISPOSITION: see below    NARRATIVE:  The patient is a 73 y.o.male referred by Janice Doshi MD with a history of cancer on the mid scalp  and on the right scalp which were biopsied - pathology accession #[id number] (right scalp) and #[id number] (mid scalp), [facility]. 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 Examination revealed an eschar with ill-defined surrounding induration to the mid scalp and an erythematous, somewhat verrucoid plaque to the right scalp at the sites of prior biopsies, which were confirmed by reference to the photograph(s) taken at the previous patient visit, as attached above. In light of the nature of these tumors and the locations on the scalp, Mohs' micrographic surgery was thought to be the most appropriate management choice, and these diagnoses are appropriate for treatment by Mohs' micrographic surgery.  I discussed it with the patient and he fully understands the aims, risks, alternatives, and possible complications, and elects to proceed.  There are no medical or surgical contraindications to the procedure.     A  "signed informed consent was obtained.    PROCEDURE:  The patient was placed in the semi-recumbent position on the operating table in the Mohs' Surgery Suite. The area in question was thoroughly prepped with ethanol andHibiclens. A sterile surgical marker was used to outline the clinically apparent margins of the involved area, and a narrow margin of normal-appearing skin, at each site. Reference marks were made at the periphery of the outlined area at each site with the surgical marker. The proposed areas of excision were measured and photographed. Local anesthesia as noted above was administered  The total volume of anesthetic used throughout this portion of the procedure was as documented above. The areas were prepared and draped in the standard manner. All of the grossly identifiable area of clinically abnormal tissue and an underlying/peripheral layer was taken at each site and processed by the Mohs' technique.  Hemostasis was obtained with the hyfrecator. Tissue was taken from any areas of residual marginal involvement (if present) at each site and processed by the Mohs' technique in as many stages as needed until a tumor-free plane was achieved at each site.    Colors of inks used in the reference nicks at epidermal margins (if present) and/or inking of non-epithelial edges, if applicable, is represented on the Mohs map as follows: solid lines represent red ink, dots represent blue ink, jagged lines represent black ink, curlicues represent green ink, "xxx" represents yellow ink.    FIRST SITE: mid scalp  The first Mohs' layer consisted of one section(s) with 2 slide(s) evaluated. Some residual tumor was noted at the margins of the first Mohs' layer. Histology of the specimen(s) showed residual invasive squamous cell carcinoma, manifested as nests of atypical epithelioid cells with nuclear pleomorphism of the keratinocytes invading into the dermis, at the peripheral margin between the black and green nicks and " between the green and red nicks, and at the deep margin, as noted on the Mohs map.    The second Mohs' layer consisted of one section(s) with 3 slide(s) evaluated. No residual tumor was noted at the margins of the second Mohs' layer. Histology of the specimen(s) showed some foci of residual actinic keratosis, manifested as mild to moderate atypia and disorganization and nuclear pleomorphism of the keratinocytes in the epidermis, without notable mitotic figures or involvement of adnexal structures, at superficial margins.    A total of two section(s) and 5 slide(s) were examined under the microscope via the Mohs technique.  A cancer free plane was reached after layer number two. Defect final size was as noted above.     SECOND SITE: right scalp  The first Mohs' layer consisted of two section(s) with 6 slide(s) evaluated. Some residual tumor was noted at the margins of the first Mohs' layer. Histology of the specimen(s) showed residual squamous cell carcinoma in situ, manifested as full-thickness atypia and disorganization and nuclear pleomorphism of the keratinocytes in the epidermis, at the superficial margin near the blue-inked edge on the first section, as noted on the Mohs map. Also as noted on the Mohs map, a focus of superficially-invasive squamous cell carcinoma was noted, on the last cuts only, near the black-inked nick on the second section; multiple earlier cuts were clear in this area, and the actual surgical margin in this area was assessed as clear.      The second Mohs' layer consisted of one section(s) with 3 slide(s) evaluated. No residual tumor was noted at the margins of the second Mohs' layer. Histology of the specimen(s) showed some residual mild to moderate atypia and disorganization and nuclear pleomorphism of the keratinocytes in the epidermis, without notable mitotic figures or involvement of adnexal structures; in light of the absence of adnexal involvement, these changes were felt to be most  consistent with bowenoid actinic keratosis, and margins were assessed as clear of carcinoma in situ.    A total of three section(s) and 9 slide(s) were examined under the microscope via the Mohs technique.  A cancer free plane was reached after layer number two. Defect final size was as noted above.      The wounds were covered with nonadherent dressings between stages, and the patient allowed to wait in the waiting area during these periods. The final defects were photographed at the completion of the Mohs' procedure.    See the separate procedure note which follows regarding repair of the defects following Mohs' surgery.  -----------------------------------------------  REPAIR FOLLOWING MOHS' MICROGRAPHIC SURGERY    PREOPERATIVE DIAGNOSIS: defect following Mohs' surgery for a squamous cell carcinoma in situ     POSTOPERATIVE DIAGNOSIS: same    PROCEDURE PERFORMED: intermediate (layered) closure     ANESTHETIC: 7.5 mL 1% Lidocaine with Epinephrine 1:100,000     SURGICAL PREP: Hibiclens    SURGEON: Sharif Moreno MD     ASSISTANTS: as above    LOCATION: right scalp      INDICATIONS:  Earlier in the day, the patient underwent Mohs' micrographic surgical excision of an invasive squamous cell carcinoma on the mid scalp and of a 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 Tumor free margins were achieved after layer number two at the right scalp site and after layer number two at the mid scalp site.  Later in the day, the management of the resulting wounds was addressed with the patient. I discussed the various wound management options with the patient and he fully understands the aims, risks, alternatives, and possible complications of the alternatives, and he elects to proceed with closure of the defects in the manner(s) noted below.  There are no medical or surgical contraindications to the procedure.    A signed informed consent was previously obtained.    PROCEDURE:  Repair via intermediate  closure:  The patient was returned to the procedure room following completion of the Mohs' procedure and final slide review. Because of the size, shape and location of the defect, simple closure could not be achieved without excessive tension on the wound margins and an unacceptable risk of wound dehiscence and standing cone deformities. After surgical prepping, additional anesthetic was infiltrated into the tissues surrounding the defect and the anticipated area of repair, to maintain anesthesia during the procedure. Preparation of the site was carried out by extending the defect through excision of small triangles of superfluous tissue on either side of the wound to square the shoulders of the defect and to allow closure without distortion by standing cone deformities, creating a fusiform defect measuring 1.6 x 5.5 cm in size. Wound margins were minimally undermined to allow closure with minimal tension. After hemostasis was achieved with the hyfrecator, closure was accomplished in layered fashion with:      multiple #3-0 buried interrupted Vicryl suture(s) and    multiple #3-0 simple interrupted and figure-eight Prolene suture(s) and    Multiple stainless steel staples for final approximation of the wound margins.    Total length of the final closure was 5.5 cm.    The site was photographed following completion of the repair. Final dressing consisted of petrolatum, Telfa and tape.    See the following note regarding the second site.  -----------------------------------------------    REPAIR FOLLOWING MOHS' MICROGRAPHIC SURGERY    PREOPERATIVE DIAGNOSIS: defect following Mohs' surgery for an invasive squamous cell carcinoma     POSTOPERATIVE DIAGNOSIS: same    PROCEDURE PERFORMED: intermediate (layered) closure     ANESTHETIC: as above    SURGICAL PREP: as above     SURGEON: Sharif Moreno MD     ASSISTANTS: as above     LOCATION: mid scalp    PROCEDURE:  Repair via intermediate closure:  The patient was  returned to the procedure room following completion of the Mohs' procedure and final slide review. Because of the size, shape and location of the defect, simple closure could not be achieved without excessive tension on the wound margins and an unacceptable risk of wound dehiscence and standing cone deformities. After surgical prepping, additional anesthetic was infiltrated into the tissues surrounding the defect and the anticipated area of repair, to maintain anesthesia during the procedure. Preparation of the site was carried out by extending the defect through excision of small triangles of superfluous tissue on either side of the wound to square the shoulders of the defect and to allow closure without distortion by standing cone deformities, creating a fusiform defect measuring 1.8 x 6.5 cm in size. Wound margins were minimally undermined to allow closure with minimal tension. After hemostasis was achieved with the hyfrecator, closure was accomplished in layered fashion with:      multiple #3-0 buried interrupted Vicryl suture(s) and    several #3-0 simple interrupted and figure-eight Prolene suture(s) and    multiple #5 stainless steel staples for final approximation of the wound margins.    Total length of the final closure was 6.5 cm.    The sum of the lengths of the two closures was 12.0 cm.    The site was photographed following completion of the repair. Final dressing consisted of petrolatum, Telfa and tape.    Estimated blood loss for the total procedure was less than 10 mL.    Total operative time including tissue processing in the Mohs' laboratory and microscopic Mohs' frozen section slide review was 3 hour(s). Verbal and written wound care instructions were given to the patient, and he expressed understanding of these instructions. The patient tolerated the procedure well and left the operating room in good condition; he is to return in two weeks for suture removal.     Although complete closure of the  two wounds was achieved, the margins were under significant tension, potentially increasing the risk of wound breakdown; and in light of his porcine cardiac valve, I have prescribed him doxycycline 100 mg po BID x 10 days prophylacticly, to reduce the risk of wound infection.    Dr. Moreno's cell phone number was given to the patient with instructions to call prn with any problems.

## 2019-09-16 ENCOUNTER — TELEPHONE (OUTPATIENT)
Dept: DERMATOLOGY | Facility: CLINIC | Age: 74
End: 2019-09-16

## 2019-09-16 RX ORDER — MUPIROCIN CALCIUM 20 MG/G
CREAM TOPICAL
Qty: 30 G | Refills: 0 | Status: SHIPPED | OUTPATIENT
Start: 2019-09-16 | End: 2019-09-17 | Stop reason: SDUPTHER

## 2019-09-16 NOTE — TELEPHONE ENCOUNTER
----- Message from Anette Ely sent at 9/16/2019  1:05 PM CDT -----  Contact: PT  Type:  RX Refill Request    Who Called:  PT  Refill or New Rx:  Refill  RX Name and Strength:  Bacrapan ointment  How is the patient currently taking it? (ex. 1XDay):  Cream  Preferred Pharmacy with phone number:    [facility]/pharmacy #[id number] - [place] - [address] AT [address] [address]  Phone: [phone] Fax: [phone]  Local or Mail Order:  local  Ordering Provider:    Regan Call Back Number:  [phone]  Additional Information:  Please Advise ---Thank you

## 2019-09-16 NOTE — TELEPHONE ENCOUNTER
Returned patient call, he stated that the sites was bleeding, I asked him how much bleeding he said not to worry about the bleeding, he wanted a a rx for bactroban ointment. He said that it makes the surgery sites heal faster.I spoke with Dr. Moreno and he snt the rx to the drug store. Patient is not worry about the bleeding. Madeline

## 2019-09-17 ENCOUNTER — TELEPHONE (OUTPATIENT)
Dept: DERMATOLOGY | Facility: CLINIC | Age: 74
End: 2019-09-17

## 2019-09-17 RX ORDER — MUPIROCIN 20 MG/G
OINTMENT TOPICAL 2 TIMES DAILY
Qty: 30 G | Refills: 0 | Status: SHIPPED | OUTPATIENT
Start: 2019-09-17 | End: 2019-10-17

## 2019-09-17 NOTE — TELEPHONE ENCOUNTER
----- Message from Arti Chaney sent at 9/17/2019  2:12 PM CDT -----  Contact: patient  Please call above patient at [phone] need to speak with the nurse thanks

## 2019-09-17 NOTE — TELEPHONE ENCOUNTER
Returned patient call and told him that Dr. Moreno sent the rx in yesterday to drug store. Madeline

## 2019-09-18 ENCOUNTER — TELEPHONE (OUTPATIENT)
Dept: VASCULAR SURGERY | Facility: CLINIC | Age: 74
End: 2019-09-18

## 2019-09-18 NOTE — TELEPHONE ENCOUNTER
Patient wanted to clarify what was going to happen at his appointment on 9/24. I explained to him that this is a consultation to discuss his options for surgery.

## 2019-09-24 ENCOUNTER — OFFICE VISIT (OUTPATIENT)
Dept: VASCULAR SURGERY | Facility: CLINIC | Age: 74
End: 2019-09-24
Payer: MEDICARE

## 2019-09-24 VITALS
DIASTOLIC BLOOD PRESSURE: 71 MMHG | HEIGHT: 69 IN | WEIGHT: 162.06 LBS | SYSTOLIC BLOOD PRESSURE: 133 MMHG | HEART RATE: 62 BPM | BODY MASS INDEX: 24 KG/M2

## 2019-09-24 DIAGNOSIS — I35.0 NONRHEUMATIC AORTIC VALVE STENOSIS: Primary | ICD-10-CM

## 2019-09-24 DIAGNOSIS — Z95.1 S/P CABG (CORONARY ARTERY BYPASS GRAFT): ICD-10-CM

## 2019-09-24 DIAGNOSIS — R53.83 EASY FATIGABILITY: ICD-10-CM

## 2019-09-24 DIAGNOSIS — R06.09 DOE (DYSPNEA ON EXERTION): ICD-10-CM

## 2019-09-24 PROCEDURE — 99999 PR PBB SHADOW E&M-EST. PATIENT-LVL III: ICD-10-PCS | Mod: PBBFAC,,, | Performed by: THORACIC SURGERY (CARDIOTHORACIC VASCULAR SURGERY)

## 2019-09-24 PROCEDURE — 99205 OFFICE O/P NEW HI 60 MIN: CPT | Mod: S$PBB,,, | Performed by: THORACIC SURGERY (CARDIOTHORACIC VASCULAR SURGERY)

## 2019-09-24 PROCEDURE — 99213 OFFICE O/P EST LOW 20 MIN: CPT | Mod: PBBFAC,PO | Performed by: THORACIC SURGERY (CARDIOTHORACIC VASCULAR SURGERY)

## 2019-09-24 PROCEDURE — 99205 PR OFFICE/OUTPT VISIT, NEW, LEVL V, 60-74 MIN: ICD-10-PCS | Mod: S$PBB,,, | Performed by: THORACIC SURGERY (CARDIOTHORACIC VASCULAR SURGERY)

## 2019-09-24 PROCEDURE — 99999 PR PBB SHADOW E&M-EST. PATIENT-LVL III: CPT | Mod: PBBFAC,,, | Performed by: THORACIC SURGERY (CARDIOTHORACIC VASCULAR SURGERY)

## 2019-09-24 NOTE — PROGRESS NOTES
CHIEF COMPLAINT:   Chief Complaint   Patient presents with    Aortic Stenosis     Andrew/aortic valve stenosis       REFERRING PROVIDER: Sadie Dyer MD    Reason for consult: Evaluation of aortic valve disease    History of Present Illness     Patient is a 73 y.o. male  With a history of coronary artery bypass in the year 2000. He has developed significant exertional dyspnea over the last year and especially the last 6 months.  This has significantly limited his ability to perform normal activities.  He has had no significant chest pain, but he has occasionally had a pain radiating down his left arm.  Echocardiography revealed severe aortic valve stenosis.  Cardiac catheterization reveals 3 vessel coronary artery disease with patent vein grafts to the circumflex and right systems in a patent left internal thoracic artery to the left anterior descending.    Patient Active Problem List    Diagnosis Date Noted    History of cancer of hard palate 08/05/2019    Antiplatelet or antithrombotic long-term use 08/05/2019    On statin therapy 08/05/2019    Reactive depression 08/05/2019    Insomnia 01/23/2019    Coronary artery disease involving coronary bypass graft of native heart without angina pectoris 05/29/2018    Tobacco abuse 02/09/2016    Hyperlipidemia with target low density lipoprotein (LDL) cholesterol less than 70 mg/dL 06/08/2014    Late onset Alzheimer's disease without behavioral disturbance 06/08/2014    Nonrheumatic aortic valve stenosis 04/02/2013    S/P CABG (coronary artery bypass graft) - x3 06/2008; LIMA to LAD; SVG to OM1; SVG to PDA 04/02/2013    HTN (hypertension) 04/09/2012     Past Medical History:   Diagnosis Date    CAD (coronary artery disease) CABG    Cancer     hard palate    Cancer 2018    skin cancer.     Colon polyps     2007, hyperplastic    Dementia     DJD (degenerative joint disease) of cervical spine     has seen Dr. Keyonna Light    Fractures     multiple     Hand arthritis     Hand pain, right     Hyperlipidemia     Nephrolithiasis       Past Surgical History:   Procedure Laterality Date    BACK SURGERY      BIOPSY  09/10/2019    per pt    CARDIAC CATHETERIZATION  09/09/2019    CARDIAC SURGERY  2004    cervical disckectomy      CORONARY ANGIOGRAPHY N/A 9/9/2019    Procedure: ANGIOGRAM, CORONARY ARTERY;  Surgeon: Heri Morales MD;  Location: [facility];  Service: Cardiology;  Laterality: N/A;    CORONARY ARTERY BYPASS GRAFT  2000    x3    occipital lipoma      oral cancer      hard palate    PPH      removal renal stone      1/16    SHOULDER ARTHROSCOPY      SHOULDER SURGERY        Medication List with Changes/Refills   Current Medications    ASCORBIC ACID (VITAMIN C) 500 MG TABLET    Every morning    ASPIRIN 81 MG CHEWABLE TABLET        ATENOLOL (TENORMIN) 25 MG TABLET    Take 0.5 tablets (12.5 mg total) by mouth every morning.    DOXYCYCLINE (VIBRAMYCIN) 100 MG CAP    Take 1 capsule (100 mg total) by mouth every 12 (twelve) hours.    ESCITALOPRAM OXALATE (LEXAPRO) 5 MG TAB    Take 1 tablet (5 mg total) by mouth once daily.    IBUPROFEN (ADVIL,MOTRIN) 600 MG TABLET    Take 1 tablet (600 mg total) by mouth every 6 (six) hours as needed for Pain.    LIDOCAINE (LIDODERM) 5 %    Place 1 patch onto the skin once daily. Remove & Discard patch within 12 hours or as directed by MD    MULTIVITAMIN (ONE DAILY MULTIVITAMIN) PER TABLET    Take 1 tablet by mouth once daily.    MUPIROCIN (BACTROBAN) 2 % OINTMENT    Apply topically 2 (two) times daily.    ROSUVASTATIN (CRESTOR) 40 MG TAB    Take 1 tablet (40 mg total) by mouth once daily.    TRAZODONE (DESYREL) 50 MG TABLET    TAKE ONE TABLET BY MOUTH EVERY NIGHT AS NEEDED     Review of patient's allergies indicates:   Allergen Reactions    No known allergies       Social History     Tobacco Use    Smoking status: Former Smoker     Types: Cigars    Smokeless tobacco: Former User     Quit date: 4/9/2000    Tobacco  "comment: quit 2002, but restarted   Substance Use Topics    Alcohol use: No      Family History   Problem Relation Age of Onset    Diabetes Mother     Hypertension Mother     Hyperlipidemia Mother     Leukemia Father     Anesthesia problems Neg Hx     Clotting disorder Neg Hx       Review of Systems    General:  Positive easy fatigability.  HEENT:  No visual field changes or hoarseness.    Neck:  No complaints.    Respiratory: Positive shortness of breath with exertion.  No cough or hemoptysis.    Cardiac:  Positive exertional dyspnea and intermittent pain in the left arm with activities.  No palpitations or orthopnea.    GI: No constipation or melena.    : No dysuria.    Skin:  No rashes.    Musculoskeletal:  No significant arthralgias.    Neurologic:  No lateralizing complaints.    Vascular:  No claudication.    Objective: Physical Exam     Vitals:    09/24/19 1313   BP: 133/71   Pulse: 62   Weight: 73.5 kg (162 lb 0.6 oz)   Height: 5' 9" (1.753 m)   PainSc: 0-No pain       Objective    General appearance: alert, appears stated age, cooperative and no distress    HEENT: Normocephalic, atraumatic.  There is good facial symmetry.    Neck: Trachea is midline.  There are no carotid bruits.    Chest:  Median sternotomy scars present.    Heart: Regular rate and rhythm with a 2-3/6 systolic murmur across the precordium.    Lungs: Clear bilaterally.    Abdomen:  Soft.  No hepatomegaly.    Extremities:  No cyanosis, clubbing, edema.    Vascular:  2+ radial and femoral pulses bilaterally.    Neurologic:  Alert and oriented x4 no focal deficits      Data Review:   Lab Results   Component Value Date    WBC 6.02 09/09/2019    HGB 14.1 09/09/2019    HCT 42.3 09/09/2019    MCV 87 09/09/2019     09/09/2019   ,   BMP   Lab Results   Component Value Date     09/09/2019    K 4.9 09/09/2019     09/09/2019    CO2 26 09/09/2019    BUN 26 (H) 09/09/2019    CREATININE 1.00 09/09/2019    CALCIUM 9.2 09/09/2019 "    ANIONGAP 7 (L) 09/09/2019    ESTGFRAFRICA >60 09/09/2019    EGFRNONAA >60 09/09/2019   ,   CMP  Sodium   Date Value Ref Range Status   09/09/2019 141 136 - 145 mmol/L Final     Potassium   Date Value Ref Range Status   09/09/2019 4.9 3.5 - 5.1 mmol/L Final     Comment:     Specimen slightly hemolyzed     Chloride   Date Value Ref Range Status   09/09/2019 108 95 - 110 mmol/L Final     CO2   Date Value Ref Range Status   09/09/2019 26 22 - 31 mmol/L Final     Glucose   Date Value Ref Range Status   09/09/2019 101 70 - 110 mg/dL Final     Comment:     The ADA recommends the following guidelines for fasting glucose:  Normal:       less than 100 mg/dL  Prediabetes:  100 mg/dL to 125 mg/dL  Diabetes:     126 mg/dL or higher       BUN, Bld   Date Value Ref Range Status   09/09/2019 26 (H) 9 - 21 mg/dL Final     Creatinine   Date Value Ref Range Status   09/09/2019 1.00 0.50 - 1.40 mg/dL Final     Calcium   Date Value Ref Range Status   09/09/2019 9.2 8.4 - 10.2 mg/dL Final     Total Protein   Date Value Ref Range Status   08/07/2019 6.9 6.0 - 8.4 g/dL Final     Albumin   Date Value Ref Range Status   08/07/2019 4.2 3.5 - 5.2 g/dL Final     Total Bilirubin   Date Value Ref Range Status   08/07/2019 0.4 0.2 - 1.3 mg/dL Final     Alkaline Phosphatase   Date Value Ref Range Status   08/07/2019 53 38 - 145 U/L Final     AST   Date Value Ref Range Status   08/07/2019 20 17 - 59 U/L Final     ALT   Date Value Ref Range Status   08/07/2019 11 10 - 44 U/L Final     Anion Gap   Date Value Ref Range Status   09/09/2019 7 (L) 8 - 16 mmol/L Final     eGFR if    Date Value Ref Range Status   09/09/2019 >60 >60 mL/min/1.73 m^2 Final     eGFR if non    Date Value Ref Range Status   09/09/2019 >60 >60 mL/min/1.73 m^2 Final     Comment:     Calculation used to obtain the estimated glomerular filtration  rate (eGFR) is the CKD-EPI equation.      ,   Lab Results   Component Value Date    INR 1.0 06/02/2011     INR 1.1 02/10/2011    INR 0.9 2004       EKG:  Test Date:    2019  Pat Name:     GLORIA ALVARENGA           Department:     Patient ID:   [id number]                   Room:           Gender:       Male                     Technician:     :          1945               Requested By:   Order Number:                          Reading MD:   Heri Morales                                   Measurements  Intervals                              Axis            Rate:                                  P:              NE:                                    QRS:            QRSD:                                  T:              QT:                                                    QTc:                                                                              Interpretive Statements  Normal sinus rhythm  ST-T changes, consider inferior ischemia    Electronically Signed On 2019 15:39:48 CST by Heri Morales          CARDIAC CATH:   ·  Severe native coronary disease with 3 widely patent vein grafts  · Continue medical management and workup for aortic valve replacement     Left Main artery:  Heavily calcified with diffuse 50-60% stenosis  Left anterior descending artery:  Calcified and 100% occluded at the mid vessel.  The mid and distal vessel fill via patent LIMA.  There is a large diagonal with only mild disease.  Left circumflex artery:  100% occluded at the mid vessel and fills via patent SVG  Right coronary artery:  Dominant vessel.  100% occluded proximally.     SVG to OM:  Widely patent graft with good distal runoff  SVG to RPDA:  Widely patent graft with good distal runoff  Lima to LAD:  Widely patent with good distal runoff     I certify that I was present for catheter insertion, catheter manipulation, angiography, and angiographic interpretation of this patient.     Procedure Log documented by Documenter: Bettie Vaca and verified by Heri Morales.     Date: 2019  Time: 2:07  PM        TRANSTHORACIC ECHO (TTE) COMPLETE   3/18/2019     · Normal left ventricular systolic function. The estimated ejection fraction is 60%  · Concentric left ventricular remodeling.  · Mild left atrial enlargement.  · Grade III (severe) left ventricular diastolic dysfunction consistent with restrictive physiology.  · Severe aortic valve stenosis.  · Aortic valve area is 0.54 cm2; peak velocity is 4.1 m/s; mean gradient is 40 mmHg.  · Mild mitral regurgitation.  · Mild to moderate pulmonary hypertension present.  · The estimated PA systolic pressure is 41 mm Hg                Assessment:     1. Severe aortic valve stenosis , non rheumatic which is symptomatic.  2. History previous coronary artery bypass.  3.  Dyspnea on exertion and easy fatigability related to above.    4.  Coronary artery disease native arteries native heart with patent vein grafts.  Also with patent arterial graft.        Plan:       I recommend that the patient undergo intervention on his aortic valve.  Given that he has had previous coronary artery bypass surgery and has patent grafts, I would recommend he undergo TAVR.  He will be referred for further testing including CT scans.

## 2019-09-24 NOTE — LETTER
September 24, 2019      Sadie Dyer MD  [address]  [address]  [facility] [address]           [facility]  [address] [place] [address]  Phone: [phone]          Patient: Sergio Fagan   MR Number: [id number]   YOB: 1945   Date of Visit: 9/24/2019       Dear Dr. Sadie Dyer:    Thank you for referring Sergio Fagan to me for evaluation. Attached you will find relevant portions of my assessment and plan of care.    If you have questions, please do not hesitate to call me. I look forward to following Sergio Fagan along with you.    Sincerely,    Sergio Casey MD    Enclosure  CC:  No Recipients    If you would like to receive this communication electronically, please contact [email or web address] or [phone] to request more information on [facility] access.    For providers and/or their staff who would like to refer a patient to [facility], please contact us through our one-stop-shop provider referral line, [facility], at [phone].    If you feel you have received this communication in error or would no longer like to receive these types of communications, please e-mail [email or web address]

## 2019-10-10 ENCOUNTER — TELEPHONE (OUTPATIENT)
Dept: DERMATOLOGY | Facility: CLINIC | Age: 74
End: 2019-10-10

## 2019-10-10 NOTE — TELEPHONE ENCOUNTER
----- Message from Arti Younger sent at 10/9/2019  5:53 PM CDT -----  Pt is calling to speak with the nurse concerning his appt scheduled.  Please contact pt at [phone]      Than you!

## 2019-10-17 PROBLEM — Z95.2 S/P AVR (AORTIC VALVE REPLACEMENT): Status: ACTIVE | Noted: 2019-10-17

## 2019-10-24 ENCOUNTER — OFFICE VISIT (OUTPATIENT)
Dept: DERMATOLOGY | Facility: CLINIC | Age: 74
End: 2019-10-24
Payer: MEDICARE

## 2019-10-24 DIAGNOSIS — Z85.828 HISTORY OF MOHS MICROGRAPHIC SURGERY FOR SKIN CANCER: Primary | ICD-10-CM

## 2019-10-24 DIAGNOSIS — Z98.890 HISTORY OF MOHS MICROGRAPHIC SURGERY FOR SKIN CANCER: Primary | ICD-10-CM

## 2019-10-24 PROCEDURE — 99024 POSTOP FOLLOW-UP VISIT: CPT | Mod: POP,,, | Performed by: DERMATOLOGY

## 2019-10-24 PROCEDURE — 99999 PR PBB SHADOW E&M-EST. PATIENT-LVL II: CPT | Mod: PBBFAC,,, | Performed by: DERMATOLOGY

## 2019-10-24 PROCEDURE — 99999 PR PBB SHADOW E&M-EST. PATIENT-LVL II: ICD-10-PCS | Mod: PBBFAC,,, | Performed by: DERMATOLOGY

## 2019-10-24 PROCEDURE — 99024 PR POST-OP FOLLOW-UP VISIT: ICD-10-PCS | Mod: POP,,, | Performed by: DERMATOLOGY

## 2019-10-24 PROCEDURE — 99212 OFFICE O/P EST SF 10 MIN: CPT | Mod: PBBFAC,PO | Performed by: DERMATOLOGY

## 2019-10-24 NOTE — PROGRESS NOTES
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 Some residual erythema as anticipated to scars    Postop photo      IMPRESSION: Well healed post Mohs' micrographic surgery x 2    PLAN:  Discussed anticipated course  Followup to Dr. Bell in 2-3 months; prn to me

## 2019-10-30 PROBLEM — I35.0 SEVERE AORTIC STENOSIS: Status: ACTIVE | Noted: 2019-10-30

## 2019-10-31 PROBLEM — Z95.3 STATUS POST TRANSCATHETER AORTIC VALVE REPLACEMENT (TAVR) USING BIOPROSTHESIS: Status: ACTIVE | Noted: 2019-10-31

## 2019-12-01 PROBLEM — D64.9 ANEMIA: Status: ACTIVE | Noted: 2019-12-01

## 2019-12-01 PROBLEM — R50.9 FEVER: Status: ACTIVE | Noted: 2019-12-01

## 2019-12-01 PROBLEM — R07.9 CHEST PAIN: Status: ACTIVE | Noted: 2019-12-01

## 2019-12-05 PROBLEM — D50.9 IRON DEFICIENCY ANEMIA: Status: ACTIVE | Noted: 2019-12-05

## 2020-01-03 ENCOUNTER — LAB VISIT (OUTPATIENT)
Dept: LAB | Facility: HOSPITAL | Age: 75
End: 2020-01-03
Attending: INTERNAL MEDICINE
Payer: MEDICARE

## 2020-01-03 DIAGNOSIS — D50.9 IRON DEFICIENCY ANEMIA, UNSPECIFIED: Primary | ICD-10-CM

## 2020-01-03 LAB
BASOPHILS # BLD AUTO: 0.03 K/UL (ref 0–0.2)
BASOPHILS NFR BLD: 0.6 % (ref 0–1.9)
DIFFERENTIAL METHOD: ABNORMAL
EOSINOPHIL # BLD AUTO: 0.1 K/UL (ref 0–0.5)
EOSINOPHIL NFR BLD: 1.8 % (ref 0–8)
ERYTHROCYTE [DISTWIDTH] IN BLOOD BY AUTOMATED COUNT: 16.2 % (ref 11.5–14.5)
FERRITIN SERPL-MCNC: 11 NG/ML (ref 20–300)
HCT VFR BLD AUTO: 38.1 % (ref 40–54)
HGB BLD-MCNC: 11.4 G/DL (ref 14–18)
IMM GRANULOCYTES # BLD AUTO: 0.02 K/UL (ref 0–0.04)
IMM GRANULOCYTES NFR BLD AUTO: 0.4 % (ref 0–0.5)
IRON SERPL-MCNC: 57 UG/DL (ref 45–160)
LYMPHOCYTES # BLD AUTO: 1.5 K/UL (ref 1–4.8)
LYMPHOCYTES NFR BLD: 29.7 % (ref 18–48)
MCH RBC QN AUTO: 26.9 PG (ref 27–31)
MCHC RBC AUTO-ENTMCNC: 29.9 G/DL (ref 32–36)
MCV RBC AUTO: 90 FL (ref 82–98)
MONOCYTES # BLD AUTO: 0.5 K/UL (ref 0.3–1)
MONOCYTES NFR BLD: 9.2 % (ref 4–15)
NEUTROPHILS # BLD AUTO: 2.9 K/UL (ref 1.8–7.7)
NEUTROPHILS NFR BLD: 58.3 % (ref 38–73)
NRBC BLD-RTO: 0 /100 WBC
PLATELET # BLD AUTO: 240 K/UL (ref 150–350)
PMV BLD AUTO: 10.9 FL (ref 9.2–12.9)
RBC # BLD AUTO: 4.24 M/UL (ref 4.6–6.2)
SATURATED IRON: 13 % (ref 20–50)
TOTAL IRON BINDING CAPACITY: 456 UG/DL (ref 250–450)
TRANSFERRIN SERPL-MCNC: 308 MG/DL (ref 200–375)
WBC # BLD AUTO: 4.91 K/UL (ref 3.9–12.7)

## 2020-01-03 PROCEDURE — 83540 ASSAY OF IRON: CPT

## 2020-01-03 PROCEDURE — 82728 ASSAY OF FERRITIN: CPT

## 2020-01-03 PROCEDURE — 36415 COLL VENOUS BLD VENIPUNCTURE: CPT | Mod: PO

## 2020-01-03 PROCEDURE — 85025 COMPLETE CBC W/AUTO DIFF WBC: CPT

## 2020-01-16 PROBLEM — R00.1 BRADYCARDIA: Status: ACTIVE | Noted: 2020-01-16

## 2020-01-16 PROBLEM — K21.9 GASTROESOPHAGEAL REFLUX DISEASE WITHOUT ESOPHAGITIS: Status: ACTIVE | Noted: 2020-01-16

## 2020-02-04 ENCOUNTER — OFFICE VISIT (OUTPATIENT)
Dept: DERMATOLOGY | Facility: CLINIC | Age: 75
End: 2020-02-04
Payer: MEDICARE

## 2020-02-04 VITALS — RESPIRATION RATE: 18 BRPM | BODY MASS INDEX: 24.52 KG/M2 | WEIGHT: 165.56 LBS | HEIGHT: 69 IN

## 2020-02-04 DIAGNOSIS — Z85.828 PERSONAL HISTORY OF MALIGNANT NEOPLASM OF SKIN: ICD-10-CM

## 2020-02-04 DIAGNOSIS — L57.0 AK (ACTINIC KERATOSIS): Primary | ICD-10-CM

## 2020-02-04 PROCEDURE — 99499 UNLISTED E&M SERVICE: CPT | Mod: S$PBB,,, | Performed by: DERMATOLOGY

## 2020-02-04 PROCEDURE — 99212 OFFICE O/P EST SF 10 MIN: CPT | Mod: PBBFAC,PO | Performed by: DERMATOLOGY

## 2020-02-04 PROCEDURE — 17000 DESTRUCT PREMALG LESION: CPT | Mod: PBBFAC,PO | Performed by: DERMATOLOGY

## 2020-02-04 PROCEDURE — 99999 PR PBB SHADOW E&M-EST. PATIENT-LVL II: ICD-10-PCS | Mod: PBBFAC,,, | Performed by: DERMATOLOGY

## 2020-02-04 PROCEDURE — 99499 NO LOS: ICD-10-PCS | Mod: S$PBB,,, | Performed by: DERMATOLOGY

## 2020-02-04 PROCEDURE — 17000 PR DESTRUCTION(LASER SURGERY,CRYOSURGERY,CHEMOSURGERY),PREMALIGNANT LESIONS,FIRST LESION: ICD-10-PCS | Mod: S$PBB,,, | Performed by: DERMATOLOGY

## 2020-02-04 PROCEDURE — 17003 DESTRUCT PREMALG LES 2-14: CPT | Mod: S$PBB,,, | Performed by: DERMATOLOGY

## 2020-02-04 PROCEDURE — 17000 DESTRUCT PREMALG LESION: CPT | Mod: S$PBB,,, | Performed by: DERMATOLOGY

## 2020-02-04 PROCEDURE — 99999 PR PBB SHADOW E&M-EST. PATIENT-LVL II: CPT | Mod: PBBFAC,,, | Performed by: DERMATOLOGY

## 2020-02-04 PROCEDURE — 17003 DESTRUCT PREMALG LES 2-14: CPT | Mod: PBBFAC,PO | Performed by: DERMATOLOGY

## 2020-02-04 PROCEDURE — 17003 DESTRUCTION, PREMALIGNANT LESIONS; SECOND THROUGH 14 LESIONS: ICD-10-PCS | Mod: S$PBB,,, | Performed by: DERMATOLOGY

## 2020-02-04 RX ORDER — FLUOROURACIL 50 MG/G
CREAM TOPICAL 2 TIMES DAILY
Qty: 40 G | Refills: 1 | Status: SHIPPED | OUTPATIENT
Start: 2020-02-04 | End: 2022-09-12

## 2020-02-04 NOTE — PROGRESS NOTES
Subjective:       Patient ID:  Sergio Fagan is a 74 y.o. male who presents for   Chief Complaint   Patient presents with    Lesion     75 yo M presents for follow up. Last OV with me 9/2019. Since last OV, he has valve replacement, pacemaker placed, and Mohs x 2 with Dr Moreno. He noticed a few scaly lesions on scalp recurring over last few years. They are asymptomatic. No prior treatments.     +pacemaker     Derm hx: SCC x 2 on scalp- Mohs per [facility] 9/13/19; HAK with SCCIS on L neck 8/20/19 (no evidence of recurrence; no treatment)          Past Medical History:   Diagnosis Date    CAD (coronary artery disease) CABG    Cancer     hard palate    Cancer 2018    skin cancer.     Colon polyps     2007, hyperplastic    Dementia     DJD (degenerative joint disease) of cervical spine     has seen Dr. Keyonna ONEILL (dyspnea on exertion) 9/24/2019    Easy fatigability 9/24/2019    Fractures     multiple    Hand arthritis     Hand pain, right     Hyperlipidemia     Nephrolithiasis     Pacemaker     Squamous cell carcinoma of skin      Review of Systems   Skin: Positive for dry skin, activity-related sunscreen use and wears hat.   Hematologic/Lymphatic: Bruises/bleeds easily (on ASA and Plavix).        Objective:    Physical Exam   Constitutional: He appears well-developed and well-nourished.   Neurological: He is alert and oriented to person, place, and time.   Psychiatric: He has a normal mood and affect.   Skin:   Areas Examined (abnormalities noted in diagram):   Scalp / Hair Palpated and Inspected  Head / Face Inspection Performed  Neck Inspection Performed                   Diagram Legend     Erythematous scaling macule/papule c/w actinic keratosis       Vascular papule c/w angioma      Pigmented verrucoid papule/plaque c/w seborrheic keratosis      Yellow umbilicated papule c/w sebaceous hyperplasia      Irregularly shaped tan macule c/w lentigo     1-2 mm smooth white papules consistent  with Milia      Movable subcutaneous cyst with punctum c/w epidermal inclusion cyst      Subcutaneous movable cyst c/w pilar cyst      Firm pink to brown papule c/w dermatofibroma      Pedunculated fleshy papule(s) c/w skin tag(s)      Evenly pigmented macule c/w junctional nevus     Mildly variegated pigmented, slightly irregular-bordered macule c/w mildly atypical nevus      Flesh colored to evenly pigmented papule c/w intradermal nevus       Pink pearly papule/plaque c/w basal cell carcinoma      Erythematous hyperkeratotic cursted plaque c/w SCC      Surgical scar with no sign of skin cancer recurrence      Open and closed comedones      Inflammatory papules and pustules      Verrucoid papule consistent consistent with wart     Erythematous eczematous patches and plaques     Dystrophic onycholytic nail with subungual debris c/w onychomycosis     Umbilicated papule    Erythematous-base heme-crusted tan verrucoid plaque consistent with inflamed seborrheic keratosis     Erythematous Silvery Scaling Plaque c/w Psoriasis     See annotation      Assessment / Plan:        AK (actinic keratosis)  Discussed treatment options including LN, field treatment with topical chemotherapy vs PDT  He would like to treat with LN first then follow with Efudex  -     fluorouracil (EFUDEX) 5 % cream; Apply topically 2 (two) times daily. Scalp x 2 weeks  Dispense: 40 g; Refill: 1  Cryosurgery Procedure Note    Verbal consent from the patient is obtained and the patient is aware of the precancerous quality and need for treatment of these lesions. Liquid nitrogen cryosurgery is applied to the 3 actinic keratoses, as detailed in the physical exam, to produce a freeze injury. The patient is aware that blisters may form and is instructed on wound care with gentle cleansing and use of vaseline ointment to keep moist until healed. The patient is instructed to call if lesions do not completely resolve.    Personal history of malignant neoplasm  of skin  No evidence of recurrence           Follow up in about 3 months (around 5/4/2020).

## 2020-04-22 ENCOUNTER — TELEPHONE (OUTPATIENT)
Dept: DERMATOLOGY | Facility: CLINIC | Age: 75
End: 2020-04-22

## 2020-04-22 NOTE — TELEPHONE ENCOUNTER
Pt contacted with instructions . Advised -  fluorouracil (EFUDEX) 5 % cream 2 times daily Daily on scalp for 2 weeks          Also advised pt that scalp should  get irritated with use of this cream .         ---- Message from Chuy Wayne sent at 4/22/2020 12:26 PM CDT -----  Type: Needs Medical Advice  Who Called:  Patient    Pharmacy name and phone #:    [id number] - [place], [place] - [address]  [place] [address]  Phone: [phone] Fax: [phone]      Best Call Back Number: [phone]  Additional Information: Patient states that he would like a callback regarding questions about his fluorouracil (EFUDEX) 5 % cream

## 2020-05-04 ENCOUNTER — PATIENT MESSAGE (OUTPATIENT)
Dept: DERMATOLOGY | Facility: CLINIC | Age: 75
End: 2020-05-04

## 2020-05-04 ENCOUNTER — TELEPHONE (OUTPATIENT)
Dept: DERMATOLOGY | Facility: CLINIC | Age: 75
End: 2020-05-04

## 2020-05-04 NOTE — TELEPHONE ENCOUNTER
Pt contacted & advised & [facility] message sent      Pt contacted with instructions . Advised -             fluorouracil (EFUDEX) 5 % cream 2 times daily Daily on scalp for 2 weeks                Also advised pt that scalp should  get irritated with use of this cream .                        ----- Message from Belle Bynum sent at 5/4/2020 11:03 AM CDT -----  Contact: Self            Name of Who is Calling: GLORIA ALVARENGA [[id number]]      What is the request in detail: Pt states he needs to discuss the usage on the  Fluorouracil. Pt is asking should he continue the medication. Please contact to further discuss and advise.        Can the clinic reply by [facility]: N      What [facility]: [phone]

## 2020-05-07 ENCOUNTER — CLINICAL SUPPORT (OUTPATIENT)
Dept: AUDIOLOGY | Facility: CLINIC | Age: 75
End: 2020-05-07
Payer: MEDICARE

## 2020-05-07 ENCOUNTER — OFFICE VISIT (OUTPATIENT)
Dept: OTOLARYNGOLOGY | Facility: CLINIC | Age: 75
End: 2020-05-07
Payer: MEDICARE

## 2020-05-07 VITALS — BODY MASS INDEX: 25.21 KG/M2 | WEIGHT: 170.19 LBS | HEIGHT: 69 IN

## 2020-05-07 DIAGNOSIS — H90.3 BILATERAL HIGH FREQUENCY SENSORINEURAL HEARING LOSS: Primary | ICD-10-CM

## 2020-05-07 DIAGNOSIS — H61.21 RIGHT EAR IMPACTED CERUMEN: ICD-10-CM

## 2020-05-07 DIAGNOSIS — H92.03 REFERRED OTALGIA, BILATERAL: ICD-10-CM

## 2020-05-07 PROCEDURE — 92567 TYMPANOMETRY: CPT | Mod: PBBFAC,PO | Performed by: AUDIOLOGIST-HEARING AID FITTER

## 2020-05-07 PROCEDURE — 99211 OFF/OP EST MAY X REQ PHY/QHP: CPT | Mod: PBBFAC,27,PO,25

## 2020-05-07 PROCEDURE — G0268 REMOVAL OF IMPACTED WAX MD: HCPCS | Mod: PBBFAC,PO,RT | Performed by: NURSE PRACTITIONER

## 2020-05-07 PROCEDURE — 99999 PR PBB SHADOW E&M-EST. PATIENT-LVL I: ICD-10-PCS | Mod: PBBFAC,,,

## 2020-05-07 PROCEDURE — 99999 PR PBB SHADOW E&M-EST. PATIENT-LVL III: ICD-10-PCS | Mod: PBBFAC,,, | Performed by: NURSE PRACTITIONER

## 2020-05-07 PROCEDURE — 99999 PR PBB SHADOW E&M-EST. PATIENT-LVL III: CPT | Mod: PBBFAC,,, | Performed by: NURSE PRACTITIONER

## 2020-05-07 PROCEDURE — G0268 PR REMOVAL OF IMPACTED WAX MD: ICD-10-PCS | Mod: S$PBB,,, | Performed by: NURSE PRACTITIONER

## 2020-05-07 PROCEDURE — 99203 OFFICE O/P NEW LOW 30 MIN: CPT | Mod: 25,S$PBB,, | Performed by: NURSE PRACTITIONER

## 2020-05-07 PROCEDURE — 92557 COMPREHENSIVE HEARING TEST: CPT | Mod: PBBFAC,PO | Performed by: AUDIOLOGIST-HEARING AID FITTER

## 2020-05-07 PROCEDURE — 99999 PR PBB SHADOW E&M-EST. PATIENT-LVL I: CPT | Mod: PBBFAC,,,

## 2020-05-07 PROCEDURE — 99203 PR OFFICE/OUTPT VISIT, NEW, LEVL III, 30-44 MIN: ICD-10-PCS | Mod: 25,S$PBB,, | Performed by: NURSE PRACTITIONER

## 2020-05-07 PROCEDURE — G0268 REMOVAL OF IMPACTED WAX MD: HCPCS | Mod: S$PBB,,, | Performed by: NURSE PRACTITIONER

## 2020-05-07 PROCEDURE — 99213 OFFICE O/P EST LOW 20 MIN: CPT | Mod: PBBFAC,PO,25 | Performed by: NURSE PRACTITIONER

## 2020-05-07 RX ORDER — BUPROPION HYDROCHLORIDE 150 MG/1
TABLET, EXTENDED RELEASE ORAL
COMMUNITY
Start: 2020-02-12 | End: 2020-05-15

## 2020-05-07 RX ORDER — PANTOPRAZOLE SODIUM 40 MG/1
TABLET, DELAYED RELEASE ORAL
COMMUNITY
Start: 2020-03-29 | End: 2022-01-27

## 2020-05-07 NOTE — PROGRESS NOTES
Sergio Fagan was seen 05/07/2020 for an audiological evaluation. Patient complains of hearing loss and pain. Pt reports a Hx of loud noise exposure but denies tinnitus and family Hx of hearing loss. He has trouble hearing in noisy environments.     Results reveal a normal-to-severe sensorineural hearing loss for the right ear, and  mild-to-profound sensorineural hearing loss for the left ear.    Speech Reception Thresholds were  0 dBHL for the right ear and 20 dBHL for the left ear.    Word recognition scores were good for the right ear and good for the left ear.   Tympanograms were Type A for the right ear and Type A with some negative pressure for the left ear.    Audiogram results were reviewed in detail with patient and all questions were answered. Results will be reviewed by ENT at the completion of this note. Recommend further medical evaluation for the asymmetry, binaural amplification pending medical clearance, repeat hearing test in one year due to noise exposure and hearing protection in loud noise. He will check with his insurance and call the clinic if he wishes to schedule a HA consult.

## 2020-05-07 NOTE — PROGRESS NOTES
Subjective:       Patient ID: Sergio Fagan is a 74 y.o. male.    Chief Complaint: Cerumen Impaction    HPI   Patient is new to ENT, referred by Dr. Sol for consultation for impacted cerumen. Patient reports bilateral intermittent otalgia X few months. Patient denies otorrhea.     It is necessary to see this patient in person in clinic today due to the time-sensitive nature of patient's chief concern.       Review of Systems    Objective:      Physical Exam   Constitutional: He is oriented to person, place, and time. Vital signs are normal. He appears well-developed and well-nourished. He is cooperative. He does not appear ill. No distress.   HENT:   Head: Normocephalic and atraumatic.   Right Ear: Hearing, tympanic membrane, external ear and ear canal normal. Tympanic membrane is not erythematous. No middle ear effusion.   Left Ear: Hearing, tympanic membrane, external ear and ear canal normal. Tympanic membrane is not erythematous.  No middle ear effusion.   Nose: Nose normal.   Mouth/Throat: Uvula is midline, oropharynx is clear and moist and mucous membranes are normal.     SEPARATE PROCEDURE IN OFFICE:   Procedure: Removal of impacted cerumen, RIGHT   Pre Procedure Diagnosis: Cerumen Impaction   Post Procedure Diagnosis: Cerumen Impaction   Verbal informed consent in regards to risk of trauma to ear canal, ear drum or hearing, discomfort during procedure and/or inability to remove cerumen impaction in one session or unforeseen events or complications.   No anesthesia.     Procedure in detail:   Ear canal visualized bilateral with appropriate size ear speculum utilizing Operating Head Binocular Otomicroscope   Utilizing the following: Ring curet, delicate alligator forceps, and/or suction cannula. The impacted cerumen of the ear canals was removed atraumatically. The TM and EAC were then inspected and found to be clear of wax. See description of TMs/EACs in PE above.   Complications: No   Condition:  Improved/Good     Eyes: EOM and lids are normal. Right eye exhibits no discharge. Left eye exhibits no discharge. No scleral icterus.   Neck: Trachea normal and normal range of motion. Neck supple. No tracheal deviation present.   Cardiovascular: Normal rate.   Pulmonary/Chest: Effort normal. No stridor. No respiratory distress. He has no wheezes.   Musculoskeletal: Normal range of motion.   Neurological: He is alert and oriented to person, place, and time. He has normal strength. Coordination and gait normal.   Skin: Skin is warm, dry and intact. He is not diaphoretic. No cyanosis. No pallor.   Psychiatric: He has a normal mood and affect. His speech is normal and behavior is normal. Judgment and thought content normal. Cognition and memory are normal.   Nursing note and vitals reviewed.      Assessment:     Right cerumen impaction removed    Bilateral high-frequency SNHL  Plan:     PATIENT IS MEDICALLY CLEARED FOR HEARING AIDS. The patient's hearing loss is not due to temporarily, correctable physical condition. There are no contraindications to hearing aid candidacy. Patient's audiogram reveals the patient is a candidate for amplification. Audiogram is reviewed in detail with the patient. The audiologist's recommendation that the patient have amplification/hearing aids is discussed and questions answered. Patient has been given information by the audiologist on how to schedule a hearing aid consultation. Patient is encouraged to wear ear protection in loud noise and return annually for hearing test. Return to clinic as needed for further ENT concerns.

## 2020-05-07 NOTE — LETTER
May 7, 2020      Noemi Sol MD  [address] [place], [place] [address]barbara MYERS  [place] [address]           [facility]  [address] [place] [address]  Phone: [phone]  Fax: [phone]          Patient: Sergio Fagan   MR Number: [id number]   YOB: 1945   Date of Visit: 5/7/2020       Dear Dr. Noemi Sol:    Thank you for referring Sergio Fagan to me for evaluation. Attached you will find relevant portions of my assessment and plan of care.    If you have questions, please do not hesitate to call me. I look forward to following Sergio Fagan along with you.    Sincerely,    Vicky Zhao NP    Enclosure  CC:  No Recipients    If you would like to receive this communication electronically, please contact [email or web address] or [phone] to request more information on [facility] Link access.    For providers and/or their staff who would like to refer a patient to [facility], please contact us through our one-stop-shop provider referral line, [facility], at [phone].    If you feel you have received this communication in error or would no longer like to receive these types of communications, please e-mail [email or web address]

## 2020-10-02 ENCOUNTER — LAB VISIT (OUTPATIENT)
Dept: LAB | Facility: HOSPITAL | Age: 75
End: 2020-10-02
Payer: MEDICARE

## 2020-10-02 DIAGNOSIS — D50.9 IRON DEFICIENCY ANEMIA, UNSPECIFIED: Primary | ICD-10-CM

## 2020-10-02 LAB
BASOPHILS # BLD AUTO: 0.03 K/UL (ref 0–0.2)
BASOPHILS NFR BLD: 0.5 % (ref 0–1.9)
DIFFERENTIAL METHOD: ABNORMAL
EOSINOPHIL # BLD AUTO: 0 K/UL (ref 0–0.5)
EOSINOPHIL NFR BLD: 0.7 % (ref 0–8)
ERYTHROCYTE [DISTWIDTH] IN BLOOD BY AUTOMATED COUNT: 15.9 % (ref 11.5–14.5)
FERRITIN SERPL-MCNC: 70 NG/ML (ref 20–300)
HCT VFR BLD AUTO: 39.1 % (ref 40–54)
HGB BLD-MCNC: 12.4 G/DL (ref 14–18)
IMM GRANULOCYTES # BLD AUTO: 0.02 K/UL (ref 0–0.04)
IMM GRANULOCYTES NFR BLD AUTO: 0.3 % (ref 0–0.5)
IRON SERPL-MCNC: 38 UG/DL (ref 45–160)
LYMPHOCYTES # BLD AUTO: 1.3 K/UL (ref 1–4.8)
LYMPHOCYTES NFR BLD: 21.4 % (ref 18–48)
MCH RBC QN AUTO: 28.2 PG (ref 27–31)
MCHC RBC AUTO-ENTMCNC: 31.7 G/DL (ref 32–36)
MCV RBC AUTO: 89 FL (ref 82–98)
MONOCYTES # BLD AUTO: 0.5 K/UL (ref 0.3–1)
MONOCYTES NFR BLD: 7.9 % (ref 4–15)
NEUTROPHILS # BLD AUTO: 4.1 K/UL (ref 1.8–7.7)
NEUTROPHILS NFR BLD: 69.2 % (ref 38–73)
NRBC BLD-RTO: 0 /100 WBC
PLATELET # BLD AUTO: 215 K/UL (ref 150–350)
PMV BLD AUTO: 11.3 FL (ref 9.2–12.9)
RBC # BLD AUTO: 4.4 M/UL (ref 4.6–6.2)
SATURATED IRON: 11 % (ref 20–50)
TOTAL IRON BINDING CAPACITY: 333 UG/DL (ref 250–450)
TRANSFERRIN SERPL-MCNC: 225 MG/DL (ref 200–375)
WBC # BLD AUTO: 5.98 K/UL (ref 3.9–12.7)

## 2020-10-02 PROCEDURE — 36415 COLL VENOUS BLD VENIPUNCTURE: CPT | Mod: PO

## 2020-10-02 PROCEDURE — 83540 ASSAY OF IRON: CPT

## 2020-10-02 PROCEDURE — 85025 COMPLETE CBC W/AUTO DIFF WBC: CPT

## 2020-10-02 PROCEDURE — 82728 ASSAY OF FERRITIN: CPT

## 2020-11-23 ENCOUNTER — TELEPHONE (OUTPATIENT)
Dept: DERMATOLOGY | Facility: CLINIC | Age: 75
End: 2020-11-23

## 2020-11-23 NOTE — TELEPHONE ENCOUNTER
Pt contacted & placed on wait list for possible sooner appointment . ----- Message from Carito Sullivan sent at 11/23/2020  4:32 PM CST -----  Contact: [phone]  Type:  Sooner Apoointment Request    Caller is requesting a sooner appointment.  Caller declined first available appointment listed below.  Caller will not accept being placed on the waitlist and is requesting a message be sent to doctor.  Name of Caller:PT  When is the first available appointment? 1/13  Symptoms White Bump on right shoulder, pt would like a Sooner appt and a appt in the Morning Time  Would the patient rather a call back or a response via [facility]? Call back  Best Call Back Number: [phone]

## 2020-11-24 ENCOUNTER — OFFICE VISIT (OUTPATIENT)
Dept: DERMATOLOGY | Facility: CLINIC | Age: 75
End: 2020-11-24
Payer: MEDICARE

## 2020-11-24 VITALS — WEIGHT: 164 LBS | HEIGHT: 69 IN | BODY MASS INDEX: 24.29 KG/M2

## 2020-11-24 DIAGNOSIS — L57.0 AK (ACTINIC KERATOSIS): ICD-10-CM

## 2020-11-24 DIAGNOSIS — D48.5 NEOPLASM OF UNCERTAIN BEHAVIOR OF SKIN: Primary | ICD-10-CM

## 2020-11-24 PROCEDURE — 11102 PR TANGENTIAL BIOPSY, SKIN, SINGLE LESION: ICD-10-PCS | Mod: S$PBB,,, | Performed by: DERMATOLOGY

## 2020-11-24 PROCEDURE — 99999 PR PBB SHADOW E&M-EST. PATIENT-LVL III: CPT | Mod: PBBFAC,,, | Performed by: DERMATOLOGY

## 2020-11-24 PROCEDURE — 88305 TISSUE EXAM BY PATHOLOGIST: CPT | Mod: 26,,, | Performed by: PATHOLOGY

## 2020-11-24 PROCEDURE — 11102 TANGNTL BX SKIN SINGLE LES: CPT | Mod: PBBFAC,PO | Performed by: DERMATOLOGY

## 2020-11-24 PROCEDURE — 99999 PR PBB SHADOW E&M-EST. PATIENT-LVL III: ICD-10-PCS | Mod: PBBFAC,,, | Performed by: DERMATOLOGY

## 2020-11-24 PROCEDURE — 99499 NO LOS: ICD-10-PCS | Mod: S$PBB,,, | Performed by: DERMATOLOGY

## 2020-11-24 PROCEDURE — 88305 TISSUE EXAM BY PATHOLOGIST: CPT | Performed by: PATHOLOGY

## 2020-11-24 PROCEDURE — 17000 PR DESTRUCTION(LASER SURGERY,CRYOSURGERY,CHEMOSURGERY),PREMALIGNANT LESIONS,FIRST LESION: ICD-10-PCS | Mod: 59,S$PBB,, | Performed by: DERMATOLOGY

## 2020-11-24 PROCEDURE — 17000 DESTRUCT PREMALG LESION: CPT | Mod: 59,PBBFAC,PO | Performed by: DERMATOLOGY

## 2020-11-24 PROCEDURE — 17000 DESTRUCT PREMALG LESION: CPT | Mod: 59,S$PBB,, | Performed by: DERMATOLOGY

## 2020-11-24 PROCEDURE — 88305 TISSUE EXAM BY PATHOLOGIST: ICD-10-PCS | Mod: 26,,, | Performed by: PATHOLOGY

## 2020-11-24 PROCEDURE — 17003 DESTRUCT PREMALG LES 2-14: CPT | Mod: S$PBB,,, | Performed by: DERMATOLOGY

## 2020-11-24 PROCEDURE — 17003 DESTRUCTION, PREMALIGNANT LESIONS; SECOND THROUGH 14 LESIONS: ICD-10-PCS | Mod: S$PBB,,, | Performed by: DERMATOLOGY

## 2020-11-24 PROCEDURE — 17003 DESTRUCT PREMALG LES 2-14: CPT | Mod: PBBFAC,PO | Performed by: DERMATOLOGY

## 2020-11-24 PROCEDURE — 11102 TANGNTL BX SKIN SINGLE LES: CPT | Mod: S$PBB,,, | Performed by: DERMATOLOGY

## 2020-11-24 PROCEDURE — 99213 OFFICE O/P EST LOW 20 MIN: CPT | Mod: PBBFAC,PO,25 | Performed by: DERMATOLOGY

## 2020-11-24 PROCEDURE — 99499 UNLISTED E&M SERVICE: CPT | Mod: S$PBB,,, | Performed by: DERMATOLOGY

## 2020-11-24 NOTE — PROGRESS NOTES
Subjective:       Patient ID:  Sergio Fagan is a 74 y.o. male who presents for   Chief Complaint   Patient presents with    Lesion     74 yr old M present for lesion to R upper chest which has been there for about 2 months. Patient states that it is painful to the touch, but there is no itching or bleeding. He has not used any OTC or prescription medications to treat. No other areas of concern today.     LOV: 2/4/2020    +pacemaker      Derm hx: SCC x 2 on scalp- Mohs per [facility] 9/13/19; HAK with SCCIS on L neck 8/20/19 (no evidence of recurrence; no treatment)  no Fhx of melanoma.        Past Medical History:   Diagnosis Date    CAD (coronary artery disease) CABG    Cancer     hard palate    Cancer 2018    skin cancer.     Colon polyps     2007, hyperplastic    Dementia     DJD (degenerative joint disease) of cervical spine     has seen Dr. Keyonna ONEILL (dyspnea on exertion) 9/24/2019    Easy fatigability 9/24/2019    Fractures     multiple    Hand arthritis     Hand pain, right     Hyperlipidemia     Nephrolithiasis     Pacemaker     Squamous cell carcinoma of skin          Review of Systems   Skin: Positive for dry skin, activity-related sunscreen use and wears hat.   Hematologic/Lymphatic: Bruises/bleeds easily (on ASA and Plavix).        Objective:    Physical Exam   Constitutional: He appears well-developed and well-nourished.   Neurological: He is alert and oriented to person, place, and time.   Psychiatric: He has a normal mood and affect.   Skin:   Areas Examined (abnormalities noted in diagram):   Scalp / Hair Palpated and Inspected  Head / Face Inspection Performed  Neck Inspection Performed                       Diagram Legend     Erythematous scaling macule/papule c/w actinic keratosis       Vascular papule c/w angioma      Pigmented verrucoid papule/plaque c/w seborrheic keratosis      Yellow umbilicated papule c/w sebaceous hyperplasia      Irregularly shaped tan macule c/w  lentigo     1-2 mm smooth white papules consistent with Milia      Movable subcutaneous cyst with punctum c/w epidermal inclusion cyst      Subcutaneous movable cyst c/w pilar cyst      Firm pink to brown papule c/w dermatofibroma      Pedunculated fleshy papule(s) c/w skin tag(s)      Evenly pigmented macule c/w junctional nevus     Mildly variegated pigmented, slightly irregular-bordered macule c/w mildly atypical nevus      Flesh colored to evenly pigmented papule c/w intradermal nevus       Pink pearly papule/plaque c/w basal cell carcinoma      Erythematous hyperkeratotic cursted plaque c/w SCC      Surgical scar with no sign of skin cancer recurrence      Open and closed comedones      Inflammatory papules and pustules      Verrucoid papule consistent consistent with wart     Erythematous eczematous patches and plaques     Dystrophic onycholytic nail with subungual debris c/w onychomycosis     Umbilicated papule    Erythematous-base heme-crusted tan verrucoid plaque consistent with inflamed seborrheic keratosis     Erythematous Silvery Scaling Plaque c/w Psoriasis     See annotation      Assessment / Plan:      Pathology Orders:     Normal Orders This Visit    Specimen to Pathology, Dermatology     Comments:    Number of Specimens:->1  ------------------------->-------------------------  Spec 1 Procedure:->Biopsy  Spec 1 Clinical Impression:->r/o SCC  Spec 1 Source:->R clavicle    Questions:    Procedure Type: Dermatology and skin neoplasms    Number of Specimens: 1    ------------------------: -------------------------    Spec 1 Procedure: Biopsy    Spec 1 Clinical Impression: r/o SCC    Spec 1 Source: R clavicle        Neoplasm of uncertain behavior of skin  -     Specimen to Pathology, Dermatology  Shave biopsy procedure note:    Shave biopsy performed after verbal consent including risk of infection, scar, recurrence, need for additional treatment of site. Area prepped with alcohol, anesthetized with  approximately 1.0cc of 1% lidocaine with epinephrine. Lesional tissue shaved with razor blade. Hemostasis achieved with application of aluminum chloride followed by hyfrecation. No complications. Dressing applied. Wound care explained.    AK (actinic keratosis)  Cryosurgery Procedure Note    Verbal consent from the patient is obtained and the patient is aware of the precancerous quality and need for treatment of these lesions. Liquid nitrogen cryosurgery is applied to the 2 actinic keratoses, as detailed in the physical exam, to produce a freeze injury. The patient is aware that blisters may form and is instructed on wound care with gentle cleansing and use of vaseline ointment to keep moist until healed. The patient is instructed to call if lesions do not completely resolve.           Follow up for pending Pathology.

## 2020-11-30 LAB
FINAL PATHOLOGIC DIAGNOSIS: NORMAL
GROSS: NORMAL

## 2020-12-08 ENCOUNTER — TELEPHONE (OUTPATIENT)
Dept: DERMATOLOGY | Facility: CLINIC | Age: 75
End: 2020-12-08

## 2020-12-08 NOTE — TELEPHONE ENCOUNTER
Pt contacted with result & procedure appointment scheduled.      ----- Message from Jamila Bell MD sent at 12/8/2020  9:13 AM CST -----  Please let the pt know that this was an SCC.  We should schedule him for 30 min procedure for ED&C vs excision.  Thanks  TYSON

## 2021-01-12 ENCOUNTER — IMMUNIZATION (OUTPATIENT)
Dept: INTERNAL MEDICINE | Facility: CLINIC | Age: 76
End: 2021-01-12
Payer: MEDICARE

## 2021-01-12 DIAGNOSIS — Z23 NEED FOR VACCINATION: ICD-10-CM

## 2021-01-12 PROCEDURE — 91300 COVID-19, MRNA, LNP-S, PF, 30 MCG/0.3 ML DOSE VACCINE: CPT | Mod: PBBFAC | Performed by: FAMILY MEDICINE

## 2021-02-02 ENCOUNTER — IMMUNIZATION (OUTPATIENT)
Dept: INTERNAL MEDICINE | Facility: CLINIC | Age: 76
End: 2021-02-02
Payer: MEDICARE

## 2021-02-02 ENCOUNTER — TELEPHONE (OUTPATIENT)
Dept: DERMATOLOGY | Facility: CLINIC | Age: 76
End: 2021-02-02

## 2021-02-02 DIAGNOSIS — Z23 NEED FOR VACCINATION: Primary | ICD-10-CM

## 2021-02-02 PROCEDURE — 0002A COVID-19, MRNA, LNP-S, PF, 30 MCG/0.3 ML DOSE VACCINE: CPT | Mod: PBBFAC | Performed by: FAMILY MEDICINE

## 2021-02-02 PROCEDURE — 91300 COVID-19, MRNA, LNP-S, PF, 30 MCG/0.3 ML DOSE VACCINE: CPT | Mod: PBBFAC | Performed by: FAMILY MEDICINE

## 2021-02-05 ENCOUNTER — TELEPHONE (OUTPATIENT)
Dept: DERMATOLOGY | Facility: CLINIC | Age: 76
End: 2021-02-05

## 2021-04-02 PROBLEM — Z95.0 PACEMAKER: Status: ACTIVE | Noted: 2021-04-02

## 2021-04-02 PROBLEM — R53.83 EASY FATIGABILITY: Status: RESOLVED | Noted: 2019-09-24 | Resolved: 2021-04-02

## 2021-04-02 PROBLEM — R07.9 CHEST PAIN: Status: RESOLVED | Noted: 2019-12-01 | Resolved: 2021-04-02

## 2021-04-02 PROBLEM — R06.09 DOE (DYSPNEA ON EXERTION): Status: RESOLVED | Noted: 2019-09-24 | Resolved: 2021-04-02

## 2021-04-02 PROBLEM — R50.9 FEVER: Status: RESOLVED | Noted: 2019-12-01 | Resolved: 2021-04-02

## 2021-11-11 ENCOUNTER — IMMUNIZATION (OUTPATIENT)
Dept: FAMILY MEDICINE | Facility: CLINIC | Age: 76
End: 2021-11-11
Payer: MEDICARE

## 2021-11-11 DIAGNOSIS — Z23 NEED FOR VACCINATION: Primary | ICD-10-CM

## 2021-11-11 PROCEDURE — 0004A COVID-19, MRNA, LNP-S, PF, 30 MCG/0.3 ML DOSE VACCINE: CPT | Mod: PBBFAC,PO

## 2021-12-16 ENCOUNTER — TELEPHONE (OUTPATIENT)
Dept: DERMATOLOGY | Facility: CLINIC | Age: 76
End: 2021-12-16
Payer: MEDICARE

## 2022-01-24 ENCOUNTER — TELEPHONE (OUTPATIENT)
Dept: DERMATOLOGY | Facility: CLINIC | Age: 77
End: 2022-01-24
Payer: MEDICARE

## 2022-03-04 ENCOUNTER — OFFICE VISIT (OUTPATIENT)
Dept: DERMATOLOGY | Facility: CLINIC | Age: 77
End: 2022-03-04
Payer: MEDICARE

## 2022-03-04 VITALS — WEIGHT: 173.06 LBS | HEIGHT: 69 IN | BODY MASS INDEX: 25.63 KG/M2 | RESPIRATION RATE: 18 BRPM

## 2022-03-04 DIAGNOSIS — L82.1 SEBORRHEIC KERATOSES: ICD-10-CM

## 2022-03-04 DIAGNOSIS — L57.0 AK (ACTINIC KERATOSIS): Primary | ICD-10-CM

## 2022-03-04 DIAGNOSIS — Z12.83 SCREENING EXAM FOR SKIN CANCER: ICD-10-CM

## 2022-03-04 DIAGNOSIS — Z85.828 HX OF NONMELANOMA SKIN CANCER: ICD-10-CM

## 2022-03-04 PROCEDURE — 17004 PR DESTRUCTION, PREMALIGNANT LESIONS; 15 OR MORE LESIONS: ICD-10-PCS | Mod: S$PBB,,, | Performed by: STUDENT IN AN ORGANIZED HEALTH CARE EDUCATION/TRAINING PROGRAM

## 2022-03-04 PROCEDURE — 99999 PR PBB SHADOW E&M-EST. PATIENT-LVL III: ICD-10-PCS | Mod: PBBFAC,,, | Performed by: STUDENT IN AN ORGANIZED HEALTH CARE EDUCATION/TRAINING PROGRAM

## 2022-03-04 PROCEDURE — 99213 OFFICE O/P EST LOW 20 MIN: CPT | Mod: PBBFAC,PO,25 | Performed by: STUDENT IN AN ORGANIZED HEALTH CARE EDUCATION/TRAINING PROGRAM

## 2022-03-04 PROCEDURE — 99213 PR OFFICE/OUTPT VISIT, EST, LEVL III, 20-29 MIN: ICD-10-PCS | Mod: 25,S$PBB,, | Performed by: STUDENT IN AN ORGANIZED HEALTH CARE EDUCATION/TRAINING PROGRAM

## 2022-03-04 PROCEDURE — 17004 DESTROY PREMAL LESIONS 15/>: CPT | Mod: S$PBB,,, | Performed by: STUDENT IN AN ORGANIZED HEALTH CARE EDUCATION/TRAINING PROGRAM

## 2022-03-04 PROCEDURE — 99213 OFFICE O/P EST LOW 20 MIN: CPT | Mod: 25,S$PBB,, | Performed by: STUDENT IN AN ORGANIZED HEALTH CARE EDUCATION/TRAINING PROGRAM

## 2022-03-04 PROCEDURE — 17004 DESTROY PREMAL LESIONS 15/>: CPT | Mod: PBBFAC,PO | Performed by: STUDENT IN AN ORGANIZED HEALTH CARE EDUCATION/TRAINING PROGRAM

## 2022-03-04 PROCEDURE — 99999 PR PBB SHADOW E&M-EST. PATIENT-LVL III: CPT | Mod: PBBFAC,,, | Performed by: STUDENT IN AN ORGANIZED HEALTH CARE EDUCATION/TRAINING PROGRAM

## 2022-03-04 NOTE — PATIENT INSTRUCTIONS

## 2022-03-04 NOTE — PROGRESS NOTES
Subjective:       Patient ID:  Sergio Fagan is a 76 y.o. male who presents for   Chief Complaint   Patient presents with    Skin Check     Patient present for UBSC, LOV 11/24/20 by Jamila Bell MD.     C/o lesions to arms, scalp, neck x months. Pt states that he cuts them off. No changes noted.,    Recent path:  1. Skin, right clavicle, shave biopsy:   - INVASIVE SQUAMOUS CELL CARCINOMA, WELL-DIFFERENTIATED, CRATERIFORM (KERATOACANTHOMATOUS TYPE).   - THE TUMOR EXTENDS TO THE DEEP AND LATERAL BIOPSY MARGINS.   MICROSCOPIC DESCRIPTION: Sections show a crateriform squamous proliferation   exhibiting mild atypia.     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    Past Medical History:  CABG: CAD (coronary artery disease)  No date: Cancer      Comment:  hard palate  2018: Cancer      Comment:  skin cancer.   No date: Colon polyps      Comment:  2007, hyperplastic  No date: Dementia  No date: DJD (degenerative joint disease) of cervical spine      Comment:  has seen Dr. Keyonna Light  9/24/2019: ONEILL (dyspnea on exertion)  9/24/2019: Easy fatigability  No date: Fractures      Comment:  multiple  No date: Hand arthritis  No date: Hand pain, right  No date: Hyperlipidemia  No date: Nephrolithiasis  No date: Pacemaker  No date: Squamous cell carcinoma of skin      Review of Systems   Skin: Positive for dry skin, activity-related sunscreen use and wears hat.   Hematologic/Lymphatic: Bruises/bleeds easily (on ASA and Plavix).        Objective:    Physical Exam   Constitutional: He appears well-developed and well-nourished.   Neurological: He is alert and oriented to person, place, and time.   Psychiatric: He has a normal mood and affect.   Skin:   Areas Examined (abnormalities noted in diagram):   Scalp / Hair Palpated and Inspected  Head / Face Inspection Performed  Neck Inspection  Performed  Chest / Axilla Inspection Performed  Abdomen Inspection Performed  Back Inspection Performed  RUE Inspected  LUE Inspection Performed                   Diagram Legend     Erythematous scaling macule/papule c/w actinic keratosis       Vascular papule c/w angioma      Pigmented verrucoid papule/plaque c/w seborrheic keratosis      Yellow umbilicated papule c/w sebaceous hyperplasia      Irregularly shaped tan macule c/w lentigo     1-2 mm smooth white papules consistent with Milia      Movable subcutaneous cyst with punctum c/w epidermal inclusion cyst      Subcutaneous movable cyst c/w pilar cyst      Firm pink to brown papule c/w dermatofibroma      Pedunculated fleshy papule(s) c/w skin tag(s)      Evenly pigmented macule c/w junctional nevus     Mildly variegated pigmented, slightly irregular-bordered macule c/w mildly atypical nevus      Flesh colored to evenly pigmented papule c/w intradermal nevus       Pink pearly papule/plaque c/w basal cell carcinoma      Erythematous hyperkeratotic cursted plaque c/w SCC      Surgical scar with no sign of skin cancer recurrence      Open and closed comedones      Inflammatory papules and pustules      Verrucoid papule consistent consistent with wart     Erythematous eczematous patches and plaques     Dystrophic onycholytic nail with subungual debris c/w onychomycosis     Umbilicated papule    Erythematous-base heme-crusted tan verrucoid plaque consistent with inflamed seborrheic keratosis     Erythematous Silvery Scaling Plaque c/w Psoriasis     See annotation      Assessment / Plan:        AK (actinic keratosis)  Cryosurgery Procedure Note    Verbal consent from the patient is obtained including, but not limited to, risk of hypopigmentation/hyperpigmentation, scar, recurrence of lesion. The patient is aware of the precancerous quality and need for treatment of these lesions. Liquid nitrogen cryosurgery is applied to the 19 actinic keratoses, as detailed in the  physical exam, to produce a freeze injury. The patient is aware that blisters may form and is instructed on wound care with gentle cleansing and use of vaseline ointment to keep moist until healed. The patient is supplied a handout on cryosurgery and is instructed to call if lesions do not completely resolve.    Hx of nonmelanoma skin cancer/Screening exam for skin cancer  Area(s) of previous NMSC evaluated with no signs of recurrence.    Upper body skin examination performed today including at least 6 points as noted in physical examination. No lesions suspicious for malignancy noted.    Recommend daily sun protection/avoidance and use of at least SPF 30, broad spectrum sunscreen (OTC drug).     Seborrheic keratoses  These are benign inherited growths without a malignant potential. Reassurance given to patient. No treatment is necessary.              Follow up in about 1 year (around 3/4/2023).

## 2022-05-16 ENCOUNTER — TELEPHONE (OUTPATIENT)
Dept: DERMATOLOGY | Facility: CLINIC | Age: 77
End: 2022-05-16
Payer: MEDICARE

## 2022-05-16 NOTE — TELEPHONE ENCOUNTER
----- Message from Clemencia Johnson sent at 5/16/2022  8:09 AM CDT -----  Contact: [phone]  Type:  Same Day Appointment Request    Caller is requesting a same day appointment.  Caller declined first available appointment listed below.      Name of Caller:  Pt  When is the first available appointment?  NA  Symptoms:  Spot on face that was frozen off in March is coming back and spreading, Pt states it burns   Best Call Back Number:  [phone]    Additional Information:   Pls call back and advise

## 2022-05-16 NOTE — TELEPHONE ENCOUNTER
Spot cryo'd at LOV (3/4/22), came back and is bigger and painful. appt made for 5/20/22 w/ Edmond Cagle MD for the spot only. Pt aware that we are only looking at the spot and to keep Viridiana appt for FBSC as we will not be doing this at the appt on 5/20/22.

## 2022-05-20 ENCOUNTER — OFFICE VISIT (OUTPATIENT)
Dept: DERMATOLOGY | Facility: CLINIC | Age: 77
End: 2022-05-20
Payer: MEDICARE

## 2022-05-20 VITALS — HEIGHT: 69 IN | WEIGHT: 173.06 LBS | RESPIRATION RATE: 18 BRPM | BODY MASS INDEX: 25.63 KG/M2

## 2022-05-20 DIAGNOSIS — D48.5 NEOPLASM OF UNCERTAIN BEHAVIOR OF SKIN: Primary | ICD-10-CM

## 2022-05-20 PROCEDURE — 99213 PR OFFICE/OUTPT VISIT, EST, LEVL III, 20-29 MIN: ICD-10-PCS | Mod: 25,S$PBB,, | Performed by: STUDENT IN AN ORGANIZED HEALTH CARE EDUCATION/TRAINING PROGRAM

## 2022-05-20 PROCEDURE — 88305 TISSUE EXAM BY PATHOLOGIST: CPT | Mod: 26,,, | Performed by: DERMATOLOGY

## 2022-05-20 PROCEDURE — 11102 TANGNTL BX SKIN SINGLE LES: CPT | Mod: S$PBB,,, | Performed by: STUDENT IN AN ORGANIZED HEALTH CARE EDUCATION/TRAINING PROGRAM

## 2022-05-20 PROCEDURE — 88305 TISSUE EXAM BY PATHOLOGIST: CPT | Performed by: DERMATOLOGY

## 2022-05-20 PROCEDURE — 99213 OFFICE O/P EST LOW 20 MIN: CPT | Mod: PBBFAC,PO | Performed by: STUDENT IN AN ORGANIZED HEALTH CARE EDUCATION/TRAINING PROGRAM

## 2022-05-20 PROCEDURE — 11102 TANGNTL BX SKIN SINGLE LES: CPT | Mod: PBBFAC,PO | Performed by: STUDENT IN AN ORGANIZED HEALTH CARE EDUCATION/TRAINING PROGRAM

## 2022-05-20 PROCEDURE — 99999 PR PBB SHADOW E&M-EST. PATIENT-LVL III: CPT | Mod: PBBFAC,,, | Performed by: STUDENT IN AN ORGANIZED HEALTH CARE EDUCATION/TRAINING PROGRAM

## 2022-05-20 PROCEDURE — 99999 PR PBB SHADOW E&M-EST. PATIENT-LVL III: ICD-10-PCS | Mod: PBBFAC,,, | Performed by: STUDENT IN AN ORGANIZED HEALTH CARE EDUCATION/TRAINING PROGRAM

## 2022-05-20 PROCEDURE — 88305 TISSUE EXAM BY PATHOLOGIST: ICD-10-PCS | Mod: 26,,, | Performed by: DERMATOLOGY

## 2022-05-20 PROCEDURE — 11102 PR TANGENTIAL BIOPSY, SKIN, SINGLE LESION: ICD-10-PCS | Mod: S$PBB,,, | Performed by: STUDENT IN AN ORGANIZED HEALTH CARE EDUCATION/TRAINING PROGRAM

## 2022-05-20 PROCEDURE — 99213 OFFICE O/P EST LOW 20 MIN: CPT | Mod: 25,S$PBB,, | Performed by: STUDENT IN AN ORGANIZED HEALTH CARE EDUCATION/TRAINING PROGRAM

## 2022-05-20 NOTE — PATIENT INSTRUCTIONS

## 2022-05-20 NOTE — PROGRESS NOTES
Subjective:       Patient ID: Sergio Fagan is a 76 y.o. male.    Chief Complaint: Spot    Patient present for spot. LOV 3/4/22 by SANDRO.    C/o spot on right eyebrow X months. Pt states spot was previously treated with cryo.    no Phx of NMSC.  no Fhx of melanoma.    Past Medical History:  CABG: CAD (coronary artery disease)  No date: Cancer      Comment:  hard palate  2018: Cancer      Comment:  skin cancer.   No date: Colon polyps      Comment:  2007, hyperplastic  No date: Dementia  No date: DJD (degenerative joint disease) of cervical spine      Comment:  has seen Dr. Keyonna Light  9/24/2019: ONEILL (dyspnea on exertion)  9/24/2019: Easy fatigability  No date: Fractures      Comment:  multiple  No date: Hand arthritis  No date: Hand pain, right  No date: Hyperlipidemia  No date: Nephrolithiasis  No date: Pacemaker  No date: Squamous cell carcinoma of skin      Review of Systems      Objective:      Physical Exam    Assessment:       Problem List Items Addressed This Visit    None         Plan:       ***

## 2022-05-20 NOTE — PROGRESS NOTES
Subjective:       Patient ID:  Sergio Fagan is a 76 y.o. male who presents for   Chief Complaint   Patient presents with    Spot     Patient present for spot. LOV 3/4/22 by SANDRO.    C/o spot on right eyebrow X months. Pt states spot was treated with Cryo.    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    Past Medical History:  CABG: CAD (coronary artery disease)  No date: Cancer      Comment:  hard palate  2018: Cancer      Comment:  skin cancer.   No date: Colon polyps      Comment:  2007, hyperplastic  No date: Dementia  No date: DJD (degenerative joint disease) of cervical spine      Comment:  has seen Dr. Keyonna Light  9/24/2019: ONEILL (dyspnea on exertion)  9/24/2019: Easy fatigability  No date: Fractures      Comment:  multiple  No date: Hand arthritis  No date: Hand pain, right  No date: Hyperlipidemia  No date: Nephrolithiasis  No date: Pacemaker  No date: Squamous cell carcinoma of skin\        Review of Systems   Constitutional: Negative for fever and chills.   Skin: Positive for dry skin, activity-related sunscreen use and wears hat.   Hematologic/Lymphatic: Bruises/bleeds easily (on ASA and Plavix).        Objective:    Physical Exam   Constitutional: He appears well-developed and well-nourished.   Neurological: He is alert and oriented to person, place, and time.   Psychiatric: He has a normal mood and affect.   Skin:   Areas Examined (abnormalities noted in diagram):   Scalp / Hair Palpated and Inspected  Head / Face Inspection Performed              Diagram Legend     Erythematous scaling macule/papule c/w actinic keratosis       Vascular papule c/w angioma      Pigmented verrucoid papule/plaque c/w seborrheic keratosis      Yellow umbilicated papule c/w sebaceous hyperplasia      Irregularly shaped tan macule c/w lentigo     1-2 mm smooth white papules  consistent with Milia      Movable subcutaneous cyst with punctum c/w epidermal inclusion cyst      Subcutaneous movable cyst c/w pilar cyst      Firm pink to brown papule c/w dermatofibroma      Pedunculated fleshy papule(s) c/w skin tag(s)      Evenly pigmented macule c/w junctional nevus     Mildly variegated pigmented, slightly irregular-bordered macule c/w mildly atypical nevus      Flesh colored to evenly pigmented papule c/w intradermal nevus       Pink pearly papule/plaque c/w basal cell carcinoma      Erythematous hyperkeratotic cursted plaque c/w SCC      Surgical scar with no sign of skin cancer recurrence      Open and closed comedones      Inflammatory papules and pustules      Verrucoid papule consistent consistent with wart     Erythematous eczematous patches and plaques     Dystrophic onycholytic nail with subungual debris c/w onychomycosis     Umbilicated papule    Erythematous-base heme-crusted tan verrucoid plaque consistent with inflamed seborrheic keratosis     Erythematous Silvery Scaling Plaque c/w Psoriasis     See annotation          Assessment / Plan:      Pathology Orders:     Normal Orders This Visit    Specimen to Pathology, Dermatology     Questions:    Procedure Type: Dermatology and skin neoplasms    Number of Specimens: 1    ------------------------: -------------------------    Spec 1 Procedure: Biopsy    Spec 1 Clinical Impression: r/o SCC    Spec 1 Source: right brow    Release to patient: Immediate        Neoplasm of uncertain behavior of skin  Shave biopsy procedure note:    Shave biopsy performed after verbal consent including risk of infection, scar, recurrence, need for additional treatment of site. Area prepped with alcohol, anesthetized with approximately 1.0cc of 1% lidocaine with epinephrine. Lesional tissue shaved with razor blade. Hemostasis achieved with application of aluminum chloride followed by hyfrecation. No complications. Dressing applied. Wound care  explained.    Discussed that if lesion is malignant, pt will need excision. He does nto want Mohs surgery and rather see ENT for this lesion if necessary.      -     Specimen to Pathology, Dermatology             No follow-ups on file.

## 2022-05-25 ENCOUNTER — TELEPHONE (OUTPATIENT)
Dept: DERMATOLOGY | Facility: CLINIC | Age: 77
End: 2022-05-25
Payer: MEDICARE

## 2022-05-25 NOTE — TELEPHONE ENCOUNTER
----- Message from Leonie Matthew sent at 5/25/2022  7:22 AM CDT -----  Regarding: test results  Contact: Patient/[phone] (home)  Type: Needs Medical Advice  Who Called:  Patient/[phone] (home)       Additional Information: Wants to know the results of his biopsy that was done on 5/20/22 in office. Please call to advise.

## 2022-05-26 LAB
FINAL PATHOLOGIC DIAGNOSIS: NORMAL
GROSS: NORMAL
Lab: NORMAL
MICROSCOPIC EXAM: NORMAL

## 2022-05-30 ENCOUNTER — TELEPHONE (OUTPATIENT)
Dept: DERMATOLOGY | Facility: CLINIC | Age: 77
End: 2022-05-30
Payer: MEDICARE

## 2022-05-30 NOTE — TELEPHONE ENCOUNTER
----- Message from Edmond Cagle MD sent at 5/27/2022  8:43 AM CDT -----  Hey yall, can we get this marlyn in for Dr. Moreno? Thanks. -ko    Skin, right brow, shave biopsy:   -SQUAMOUS CELL CARCINOMA,  SUPERFICIALLY INVASIVE, EXCISED IN THE PLANES OF   SECTIONS EXAMINED

## 2022-05-31 ENCOUNTER — TELEPHONE (OUTPATIENT)
Dept: DERMATOLOGY | Facility: CLINIC | Age: 77
End: 2022-05-31
Payer: MEDICARE

## 2022-05-31 ENCOUNTER — TELEPHONE (OUTPATIENT)
Dept: HEMATOLOGY/ONCOLOGY | Facility: CLINIC | Age: 77
End: 2022-05-31
Payer: MEDICARE

## 2022-05-31 ENCOUNTER — PATIENT MESSAGE (OUTPATIENT)
Dept: DERMATOLOGY | Facility: CLINIC | Age: 77
End: 2022-05-31
Payer: MEDICARE

## 2022-05-31 NOTE — TELEPHONE ENCOUNTER
----- Message from Felicitas Cowart sent at 5/31/2022  2:24 PM CDT -----  Type: Needs Medical Advice  Who Called: Patient   Symptoms (please be specific):    How long has patient had these symptoms:    Pharmacy name and phone #:    Best Call Back Number: [phone]  Additional Information: Patient is requesting a call back from Ofe.

## 2022-05-31 NOTE — TELEPHONE ENCOUNTER
Pt calling to ask if he should keep his scheduled appointment with dermatology because he is scheduled to see Dr Oliva 6/20/22 at 11am.  Encouraged to call his dermatologist to discuss as Dr Oliva is head and neck only.  Verbalized understanding.

## 2022-05-31 NOTE — NURSING
As per Dr. Oliva schedule at next available.  Spoke with patient, appointment scheduled.  Patient requested morning appointment due to transportation.  Patient verbalized understanding of date, time and location.

## 2022-05-31 NOTE — TELEPHONE ENCOUNTER
Per Dr Oliva, called and scheduled pt for this thurs, first avail appt.  Confirmed appt date time and location.

## 2022-06-01 NOTE — PROGRESS NOTES
Date of Encounter: 6/2/2022  Provider: Stefanie Oliva MD  Referring MD: Sharif Moreno MD  PCP: Noemi Sol MD  Derm: Edmond Bui MD  Cardiology: MD Andrew( [place])    CC: superficially invasive SCCA    HPI:      Patient is a 76-year-old male who was referred for treatment of a superficially invasive squamous cell carcinoma of the forehead skin by Dr. Sharif Moreno.  Patient is a candidate for Moh's microsurgery but he reports that he will not be able to tolerate it and wishes IV sedation.    Patient reports that he noted a lesion involving his right eyebrow a few months ago. Lesion has grown. No bleeding. No pain. Hx of skin cancer extremities in past but not on face.  Hx of sun exposure.     Patient is concerned about his vision due to location of lesion. No eye complaints at this time.    ROS: see HPI  Constitutional: Negative for activity change and appetite change, weight loss.   Eyes: Negative for discharge, visual changes.   Respiratory: Negative for difficulty breathing and wheezing   Cardiovascular: Negative for chest pain.   Gastrointestinal: Negative for abdominal distention and abdominal pain.   Endocrine: Negative for cold intolerance and heat intolerance.   Genitourinary: Negative for dysuria.   Musculoskeletal: Negative for gait problem, muscle pain and joint swelling.   Skin: Negative for color change and pallor; negative for skin lesions.   Neurological: Negative for syncope and weakness; no numbness face.   Psychiatric/Behavioral: Negative for agitation and confusion; negative for depression.    Physical Exam:      Constitutional  · General Appearance: well nourished, well-developed, alert, oriented, in no acute distress  · Communication: ability, understanding, normal  Head and Face  · Inspection: normocephalic, atraumatic, 2 X 1/2 cm lesion involving right eyebrow--thick medially at biopsy site and scaly at lateral aspect--photos in Epic under MEDIA  Eyes  · Ocular Motility /  Alignment: normal alignment, motility, no proptosis, enophthalmus or nystagmus  · Conjunctiva: not injected  · Eyelids: no hooding, lag, entropion, or ectropion    Neurological  · Cranial Nerves: grossly intact  · General: no focal deficits  Psychiatric  · Orientation: oriented to time, place and person  · Mood and Affect: no depression, anxiety or agitation  :  Final Pathologic Diagnosis Skin, right brow, shave biopsy:   -SQUAMOUS CELL CARCINOMA,  SUPERFICIALLY INVASIVE, EXCISED IN THE PLANES OF   SECTIONS EXAMINED   This lesion is skin cancer. You will be contacted regarding treatment.        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

## 2022-06-02 ENCOUNTER — OFFICE VISIT (OUTPATIENT)
Dept: HEMATOLOGY/ONCOLOGY | Facility: CLINIC | Age: 77
End: 2022-06-02
Payer: MEDICARE

## 2022-06-02 ENCOUNTER — TELEPHONE (OUTPATIENT)
Dept: HEMATOLOGY/ONCOLOGY | Facility: CLINIC | Age: 77
End: 2022-06-02

## 2022-06-02 VITALS
HEART RATE: 70 BPM | DIASTOLIC BLOOD PRESSURE: 71 MMHG | TEMPERATURE: 97 F | OXYGEN SATURATION: 97 % | RESPIRATION RATE: 18 BRPM | WEIGHT: 168.44 LBS | SYSTOLIC BLOOD PRESSURE: 132 MMHG | HEIGHT: 69 IN | BODY MASS INDEX: 24.95 KG/M2

## 2022-06-02 DIAGNOSIS — C44.300 SKIN CANCER OF FACE: ICD-10-CM

## 2022-06-02 DIAGNOSIS — C44.300 SKIN CANCER OF FACE: Primary | ICD-10-CM

## 2022-06-02 PROCEDURE — 99999 PR PBB SHADOW E&M-EST. PATIENT-LVL IV: ICD-10-PCS | Mod: PBBFAC,,, | Performed by: OTOLARYNGOLOGY

## 2022-06-02 PROCEDURE — 99214 PR OFFICE/OUTPT VISIT, EST, LEVL IV, 30-39 MIN: ICD-10-PCS | Mod: S$PBB,,, | Performed by: OTOLARYNGOLOGY

## 2022-06-02 PROCEDURE — 99999 PR PBB SHADOW E&M-EST. PATIENT-LVL IV: CPT | Mod: PBBFAC,,, | Performed by: OTOLARYNGOLOGY

## 2022-06-02 PROCEDURE — 99214 OFFICE O/P EST MOD 30 MIN: CPT | Mod: PBBFAC,PN | Performed by: OTOLARYNGOLOGY

## 2022-06-02 PROCEDURE — 99214 OFFICE O/P EST MOD 30 MIN: CPT | Mod: S$PBB,,, | Performed by: OTOLARYNGOLOGY

## 2022-06-02 RX ORDER — RIVASTIGMINE 4.6 MG/24H
1 PATCH, EXTENDED RELEASE TRANSDERMAL DAILY
COMMUNITY
Start: 2022-05-20 | End: 2022-06-24 | Stop reason: CLARIF

## 2022-06-02 RX ORDER — DONEPEZIL HYDROCHLORIDE 10 MG/1
1 TABLET, FILM COATED ORAL DAILY
COMMUNITY
Start: 2022-04-27 | End: 2022-06-24 | Stop reason: CLARIF

## 2022-06-02 NOTE — TELEPHONE ENCOUNTER
Received cardiac clearance from Dr Morales with [facility] to hold Plavix 5 days.  No voice mail, will call back.      Called and spoke with pt.  Pt verbalized understanding of above.

## 2022-06-03 DIAGNOSIS — H91.90 HEARING DIFFICULTY, UNSPECIFIED LATERALITY: Primary | ICD-10-CM

## 2022-06-06 ENCOUNTER — TELEPHONE (OUTPATIENT)
Dept: HEMATOLOGY/ONCOLOGY | Facility: CLINIC | Age: 77
End: 2022-06-06
Payer: MEDICARE

## 2022-06-06 NOTE — TELEPHONE ENCOUNTER
"Received message from PAT that pt did not show for his appointment this morning.  Called pt and gave him PAT contact information to schedule his appointment.  Pt stated that he will call and schedule his appointment "right now."  "

## 2022-06-06 NOTE — NURSING
Oncology Navigation   Intake  Cancer Type: Head and Neck  Referral Source: Msg sent from Dr. Moreno  Initial Nurse Navigator Contact: 5/31/2022 (Lay navigator contacted patient)  Date Worked: 6/6/2022  First Appointment Available: 6/2/2022  Appointment Date: 6/2/2022  First Available Date vs. Scheduled Date (days): 0     Treatment     Surgery: Planned  Surgical Oncologist: Dr. Stefanie Oliva  Type of Surgery: WLE  Consult Date: 6/2/2022  Surgery Schedule Date: 6/15/2022                          Acuity      Follow Up  No follow-ups on file.

## 2022-06-10 ENCOUNTER — OFFICE VISIT (OUTPATIENT)
Dept: DERMATOLOGY | Facility: CLINIC | Age: 77
End: 2022-06-10
Payer: MEDICARE

## 2022-06-10 VITALS — BODY MASS INDEX: 26.61 KG/M2 | WEIGHT: 169.56 LBS | RESPIRATION RATE: 18 BRPM | HEIGHT: 67 IN

## 2022-06-10 DIAGNOSIS — D48.5 NEOPLASM OF UNCERTAIN BEHAVIOR OF SKIN: Primary | ICD-10-CM

## 2022-06-10 DIAGNOSIS — L57.0 ACTINIC KERATOSIS: ICD-10-CM

## 2022-06-10 DIAGNOSIS — L57.8 SOLAR ELASTOSIS: ICD-10-CM

## 2022-06-10 PROCEDURE — 17003 DESTRUCT PREMALG LES 2-14: CPT | Mod: S$PBB,,, | Performed by: STUDENT IN AN ORGANIZED HEALTH CARE EDUCATION/TRAINING PROGRAM

## 2022-06-10 PROCEDURE — 17003 DESTRUCT PREMALG LES 2-14: CPT | Mod: PBBFAC,PO | Performed by: STUDENT IN AN ORGANIZED HEALTH CARE EDUCATION/TRAINING PROGRAM

## 2022-06-10 PROCEDURE — 17000 PR DESTRUCTION(LASER SURGERY,CRYOSURGERY,CHEMOSURGERY),PREMALIGNANT LESIONS,FIRST LESION: ICD-10-PCS | Mod: S$PBB,59,, | Performed by: STUDENT IN AN ORGANIZED HEALTH CARE EDUCATION/TRAINING PROGRAM

## 2022-06-10 PROCEDURE — 11102 TANGNTL BX SKIN SINGLE LES: CPT | Mod: S$PBB,,, | Performed by: STUDENT IN AN ORGANIZED HEALTH CARE EDUCATION/TRAINING PROGRAM

## 2022-06-10 PROCEDURE — 88305 TISSUE EXAM BY PATHOLOGIST: CPT | Mod: 26,,, | Performed by: PATHOLOGY

## 2022-06-10 PROCEDURE — 99999 PR PBB SHADOW E&M-EST. PATIENT-LVL IV: CPT | Mod: PBBFAC,,, | Performed by: STUDENT IN AN ORGANIZED HEALTH CARE EDUCATION/TRAINING PROGRAM

## 2022-06-10 PROCEDURE — 17000 DESTRUCT PREMALG LESION: CPT | Mod: S$PBB,59,, | Performed by: STUDENT IN AN ORGANIZED HEALTH CARE EDUCATION/TRAINING PROGRAM

## 2022-06-10 PROCEDURE — 88305 TISSUE EXAM BY PATHOLOGIST: CPT | Mod: 59 | Performed by: PATHOLOGY

## 2022-06-10 PROCEDURE — 11103 PR TANGENTIAL BIOPSY, SKIN, EA ADDTL LESION: ICD-10-PCS | Mod: S$PBB,,, | Performed by: STUDENT IN AN ORGANIZED HEALTH CARE EDUCATION/TRAINING PROGRAM

## 2022-06-10 PROCEDURE — 17000 DESTRUCT PREMALG LESION: CPT | Mod: PBBFAC,PO | Performed by: STUDENT IN AN ORGANIZED HEALTH CARE EDUCATION/TRAINING PROGRAM

## 2022-06-10 PROCEDURE — 99213 OFFICE O/P EST LOW 20 MIN: CPT | Mod: 25,S$PBB,, | Performed by: STUDENT IN AN ORGANIZED HEALTH CARE EDUCATION/TRAINING PROGRAM

## 2022-06-10 PROCEDURE — 11102 TANGNTL BX SKIN SINGLE LES: CPT | Mod: PBBFAC,PO | Performed by: STUDENT IN AN ORGANIZED HEALTH CARE EDUCATION/TRAINING PROGRAM

## 2022-06-10 PROCEDURE — 11102 PR TANGENTIAL BIOPSY, SKIN, SINGLE LESION: ICD-10-PCS | Mod: S$PBB,,, | Performed by: STUDENT IN AN ORGANIZED HEALTH CARE EDUCATION/TRAINING PROGRAM

## 2022-06-10 PROCEDURE — 99213 PR OFFICE/OUTPT VISIT, EST, LEVL III, 20-29 MIN: ICD-10-PCS | Mod: 25,S$PBB,, | Performed by: STUDENT IN AN ORGANIZED HEALTH CARE EDUCATION/TRAINING PROGRAM

## 2022-06-10 PROCEDURE — 99214 OFFICE O/P EST MOD 30 MIN: CPT | Mod: PBBFAC,PO,25 | Performed by: STUDENT IN AN ORGANIZED HEALTH CARE EDUCATION/TRAINING PROGRAM

## 2022-06-10 PROCEDURE — 11103 TANGNTL BX SKIN EA SEP/ADDL: CPT | Mod: PBBFAC,PO | Performed by: STUDENT IN AN ORGANIZED HEALTH CARE EDUCATION/TRAINING PROGRAM

## 2022-06-10 PROCEDURE — 88305 TISSUE EXAM BY PATHOLOGIST: ICD-10-PCS | Mod: 26,,, | Performed by: PATHOLOGY

## 2022-06-10 PROCEDURE — 99999 PR PBB SHADOW E&M-EST. PATIENT-LVL IV: ICD-10-PCS | Mod: PBBFAC,,, | Performed by: STUDENT IN AN ORGANIZED HEALTH CARE EDUCATION/TRAINING PROGRAM

## 2022-06-10 PROCEDURE — 11103 TANGNTL BX SKIN EA SEP/ADDL: CPT | Mod: S$PBB,,, | Performed by: STUDENT IN AN ORGANIZED HEALTH CARE EDUCATION/TRAINING PROGRAM

## 2022-06-10 PROCEDURE — 17003 DESTRUCTION, PREMALIGNANT LESIONS; SECOND THROUGH 14 LESIONS: ICD-10-PCS | Mod: S$PBB,,, | Performed by: STUDENT IN AN ORGANIZED HEALTH CARE EDUCATION/TRAINING PROGRAM

## 2022-06-10 NOTE — PATIENT INSTRUCTIONS
CRYOSURGERY      Your doctor has used a method called cryosurgery to treat your skin condition. Cryosurgery refers to the use of very cold substances to treat a variety of skin conditions such as warts, pre-skin cancers, molluscum contagiosum, sun spots, and several benign growths. The substance we use in cryosurgery is liquid nitrogen and is so cold (-195 degrees Celsius) that is burns when administered.     Following treatment in the office, the skin may immediately burn and become red. You may find the area around the lesion is affected as well. It is sometimes necessary to treat not only the lesion, but a small area of the surrounding normal skin to achieve a good response.     A blister, and even a blood filled blister, may form after treatment.   This is a normal response. If the blister is painful, it is acceptable to sterilize a needle and with rubbing alcohol and gently pop the blister. It is important that you gently wash the area with soap and warm water as the blister fluid may contain wart virus if a wart was treated. Do no remove the roof of the blister.     The area treated can take anywhere from 1-3 weeks to heal. Healing time depends on the kind skin lesion treated, the location, and how aggressively the lesion was treated. It is recommended that the areas treated are covered with Vaseline or bacitracin ointment and a band-aid. If a band-aid is not practical, just ointment applied several times per day will do. Keeping these areas moist will speed the healing time.    Treatment with liquid nitrogen can leave a scar. In dark skin, it may be a light or dark scar, in light skin it may be a white or pink scar. These will generally fade with time, but may never go away completely.     If you have any concerns after your treatment, please feel free to call the office.       [address] [facility] [place] [address]/ [phone] FAX/ [email or web address]  Shave Biopsy Wound Care    Your  doctor has performed a shave biopsy today.  A band aid and vaseline ointment has been placed over the site.  This should remain in place for NO LONGER THAN 48 hours.  It is fine to remove the bandaid after 24 hours, if the area is no longer bleeding. It is recommended that you keep the area dry (do not wet)) for the first 24 hours.  After 24 hours, wash the area with warm soap and water and apply Vaseline jelly.  Many patients prefer to use Neosporin or Bacitracin ointment.  This is acceptable; however, know that you can develop an allergy to this medication even if you have used it safely for years.  It is important to keep the area moist.  Letting it dry out and get air slows healing time, and will worsen the scar.        If you notice increasing redness, tenderness, pain, or yellow drainage at the biopsy site, please notify your doctor.  These are signs of an infection.    If your biopsy site is bleeding, apply firm pressure for 15 minutes straight.  Repeat for another 15 minutes, if it is still bleeding.   If the surgical site continues to bleed, then please contact your doctor.      For [facility] users:   You will receive your biopsy results in [facility] as soon as they are available. Please be assured that your physician/provider will review your results and will then determine what further treatment, evaluation, or planning is required. You should be contacted by your physician's/provider's office within 5 business days of receiving your results; If not, please reach out to directly. This is one more way [facility] is putting you first.     [facility] [place] [address]/ [phone] FAX/ [email or web address]

## 2022-06-10 NOTE — PROGRESS NOTES
Subjective:       Patient ID:  Sergio Fagan is a 76 y.o. male who presents for   Chief Complaint   Patient presents with    Lesion     Patient present for follow-up. LOV 5/20/22 by Edmond Cagle MD.    C/o lesions to neck and arms x months. Pt states they were treated w/ cryo prior but did not seem to help.    Recent path:  Skin, right brow, shave biopsy:   -SQUAMOUS CELL CARCINOMA,  SUPERFICIALLY INVASIVE, EXCISED IN THE PLANES OF SECTIONS EXAMINED     +pacemaker      Derm hx:  SCC- R brow surgery w/ Hazel on 6/15/22  Scc- R clavicle   SCC x 2 on scalp- Mohs per [facility] 9/13/19;  HAK with SCCIS on L neck 8/20/19 (no evidence of recurrence; no treatment)  no Fhx of melanoma.    Past Medical History:  No date: Anticoagulant long-term use  CABG: CAD (coronary artery disease)  No date: Cancer      Comment:  hard palate  2018: Cancer      Comment:  skin cancer.   No date: Colon polyps      Comment:  2007, hyperplastic  No date: Dementia  No date: DJD (degenerative joint disease) of cervical spine      Comment:  has seen Dr. Keyonna Light  09/24/2019: ONEILL (dyspnea on exertion)  09/24/2019: Easy fatigability  No date: Fractures      Comment:  multiple  No date: Hand arthritis  No date: Hand pain, right  No date: Hyperlipidemia  No date: Nephrolithiasis  No date: Pacemaker  No date: Squamous cell carcinoma of skin      Review of Systems   Constitutional: Negative for fever and chills.   Skin: Positive for dry skin, activity-related sunscreen use and wears hat.   Hematologic/Lymphatic: Bruises/bleeds easily (on ASA and Plavix).        Objective:    Physical Exam   Constitutional: He appears well-developed and well-nourished.   Neurological: He is alert and oriented to person, place, and time.   Psychiatric: He has a normal mood and affect.   Skin:   Areas Examined (abnormalities noted in diagram):   Scalp / Hair Palpated and Inspected  Head / Face Inspection Performed  RUE Inspected  LUE Inspection Performed                    Diagram Legend     Erythematous scaling macule/papule c/w actinic keratosis       Vascular papule c/w angioma      Pigmented verrucoid papule/plaque c/w seborrheic keratosis      Yellow umbilicated papule c/w sebaceous hyperplasia      Irregularly shaped tan macule c/w lentigo     1-2 mm smooth white papules consistent with Milia      Movable subcutaneous cyst with punctum c/w epidermal inclusion cyst      Subcutaneous movable cyst c/w pilar cyst      Firm pink to brown papule c/w dermatofibroma      Pedunculated fleshy papule(s) c/w skin tag(s)      Evenly pigmented macule c/w junctional nevus     Mildly variegated pigmented, slightly irregular-bordered macule c/w mildly atypical nevus      Flesh colored to evenly pigmented papule c/w intradermal nevus       Pink pearly papule/plaque c/w basal cell carcinoma      Erythematous hyperkeratotic cursted plaque c/w SCC      Surgical scar with no sign of skin cancer recurrence      Open and closed comedones      Inflammatory papules and pustules      Verrucoid papule consistent consistent with wart     Erythematous eczematous patches and plaques     Dystrophic onycholytic nail with subungual debris c/w onychomycosis     Umbilicated papule    Erythematous-base heme-crusted tan verrucoid plaque consistent with inflamed seborrheic keratosis     Erythematous Silvery Scaling Plaque c/w Psoriasis     See annotation                  Assessment / Plan:      Pathology Orders:     Normal Orders This Visit    Specimen to Pathology, Dermatology     Questions:    Procedure Type: Dermatology and skin neoplasms    Number of Specimens: 3    ------------------------: -------------------------    Spec 1 Procedure: Biopsy    Spec 1 Clinical Impression: r/o ak vs sccis    Spec 1 Source: left neck    ------------------------: -------------------------    Spec 2 Procedure: Biopsy    Spec 2 Clinical Impression: r/o scc vs HAK    Spec 2 Source: Right neck    ------------------------:  -------------------------    Spec 3 Procedure: Biopsy    Spec 3 Clinical Impression: r/o scc vs prurigo    Spec 3 Source: Left dorsal hand    Release to patient: Immediate        Neoplasm of uncertain behavior of skin  Shave biopsy procedure note:    Shave biopsy performed after verbal consent including risk of infection, scar, recurrence, need for additional treatment of site. Area prepped with alcohol, anesthetized with approximately 1.0cc of 1% lidocaine with epinephrine. Lesional tissue shaved with razor blade. Hemostasis achieved with application of aluminum chloride followed by hyfrecation. No complications. Dressing applied. Wound care explained.      -     Specimen to Pathology, Dermatology    Actinic keratosis  Cryosurgery Procedure Note    Verbal consent from the patient is obtained including, but not limited to, risk of hypopigmentation/hyperpigmentation, scar, recurrence of lesion. The patient is aware of the precancerous quality and need for treatment of these lesions. Liquid nitrogen cryosurgery is applied to the 5 actinic keratoses, as detailed in the physical exam, to produce a freeze injury. The patient is aware that blisters may form and is instructed on wound care with gentle cleansing and use of vaseline ointment to keep moist until healed. The patient is supplied a handout on cryosurgery and is instructed to call if lesions do not completely resolve.    Solar Elastosis  Recommend daily sun protection/avoidance and use of at least SPF 30, broad spectrum sunscreen (OTC drug).            No follow-ups on file.

## 2022-06-15 DIAGNOSIS — S01.80XA OPEN WOUND OF FACE WITHOUT COMPLICATION, INITIAL ENCOUNTER: Primary | ICD-10-CM

## 2022-06-15 RX ORDER — ERYTHROMYCIN 5 MG/G
OINTMENT OPHTHALMIC
Qty: 3.5 G | Refills: 3 | Status: SHIPPED | OUTPATIENT
Start: 2022-06-15 | End: 2022-09-12

## 2022-06-17 LAB
FINAL PATHOLOGIC DIAGNOSIS: NORMAL
GROSS: NORMAL
Lab: NORMAL
MICROSCOPIC EXAM: NORMAL

## 2022-06-20 ENCOUNTER — TELEPHONE (OUTPATIENT)
Dept: HEMATOLOGY/ONCOLOGY | Facility: CLINIC | Age: 77
End: 2022-06-20
Payer: MEDICARE

## 2022-06-20 ENCOUNTER — PATIENT MESSAGE (OUTPATIENT)
Dept: HEMATOLOGY/ONCOLOGY | Facility: CLINIC | Age: 77
End: 2022-06-20
Payer: MEDICARE

## 2022-06-20 NOTE — TELEPHONE ENCOUNTER
----- Message from Felicitas Cowart sent at 6/20/2022  1:26 PM CDT -----  Type: Needs Medical Advice  Who Called:  Umair (son)  Symptoms (please be specific):  yellowish color on wound  How long has patient had these symptoms:  wound has been this color for 2 days  Pharmacy name and phone #:    Best Call Back Number:   Additional Information: Mr. Block is requesting a call back regarding his dad wound from surgery.

## 2022-06-20 NOTE — TELEPHONE ENCOUNTER
Pt s/p excision of lesion on face 6/15/22, calling to report yellow drainage and burning in surgical wound. Pt denies fever, chills, redness at wound site.  Requested pt send pictures through my chart and made appt for pt to be seen in clinc tomorrow. Dr Oliva notified.

## 2022-06-21 ENCOUNTER — OFFICE VISIT (OUTPATIENT)
Dept: OTOLARYNGOLOGY | Facility: CLINIC | Age: 77
End: 2022-06-21
Payer: MEDICARE

## 2022-06-21 ENCOUNTER — CLINICAL SUPPORT (OUTPATIENT)
Dept: HEMATOLOGY/ONCOLOGY | Facility: CLINIC | Age: 77
End: 2022-06-21
Payer: MEDICARE

## 2022-06-21 ENCOUNTER — TELEPHONE (OUTPATIENT)
Dept: HEMATOLOGY/ONCOLOGY | Facility: CLINIC | Age: 77
End: 2022-06-21

## 2022-06-21 ENCOUNTER — CLINICAL SUPPORT (OUTPATIENT)
Dept: AUDIOLOGY | Facility: CLINIC | Age: 77
End: 2022-06-21
Payer: MEDICARE

## 2022-06-21 VITALS — HEIGHT: 67 IN | TEMPERATURE: 99 F | WEIGHT: 168.44 LBS | BODY MASS INDEX: 26.44 KG/M2

## 2022-06-21 VITALS
SYSTOLIC BLOOD PRESSURE: 160 MMHG | HEART RATE: 80 BPM | OXYGEN SATURATION: 97 % | TEMPERATURE: 98 F | DIASTOLIC BLOOD PRESSURE: 86 MMHG | RESPIRATION RATE: 14 BRPM

## 2022-06-21 DIAGNOSIS — H61.22 IMPACTED CERUMEN OF LEFT EAR: Primary | ICD-10-CM

## 2022-06-21 DIAGNOSIS — H90.3 BILATERAL SENSORINEURAL HEARING LOSS: Primary | ICD-10-CM

## 2022-06-21 DIAGNOSIS — H90.3 BILATERAL SENSORINEURAL HEARING LOSS: ICD-10-CM

## 2022-06-21 DIAGNOSIS — H93.13 TINNITUS, BILATERAL: ICD-10-CM

## 2022-06-21 PROCEDURE — G0268 REMOVAL OF IMPACTED WAX MD: HCPCS | Mod: PBBFAC,PO | Performed by: NURSE PRACTITIONER

## 2022-06-21 PROCEDURE — G0268 PR REMOVAL OF IMPACTED WAX MD: ICD-10-PCS | Mod: 79,S$PBB,, | Performed by: NURSE PRACTITIONER

## 2022-06-21 PROCEDURE — 99999 PR PBB SHADOW E&M-EST. PATIENT-LVL IV: ICD-10-PCS | Mod: PBBFAC,,, | Performed by: NURSE PRACTITIONER

## 2022-06-21 PROCEDURE — 99999 PR PBB SHADOW E&M-EST. PATIENT-LVL IV: CPT | Mod: PBBFAC,,, | Performed by: NURSE PRACTITIONER

## 2022-06-21 PROCEDURE — G0268 REMOVAL OF IMPACTED WAX MD: HCPCS | Mod: 79,S$PBB,, | Performed by: NURSE PRACTITIONER

## 2022-06-21 PROCEDURE — 92557 COMPREHENSIVE HEARING TEST: CPT | Mod: PBBFAC,PO | Performed by: AUDIOLOGIST-HEARING AID FITTER

## 2022-06-21 PROCEDURE — 99214 OFFICE O/P EST MOD 30 MIN: CPT | Mod: PBBFAC,27,PO,25 | Performed by: NURSE PRACTITIONER

## 2022-06-21 PROCEDURE — 99214 PR OFFICE/OUTPT VISIT, EST, LEVL IV, 30-39 MIN: ICD-10-PCS | Mod: 25,24,S$PBB, | Performed by: NURSE PRACTITIONER

## 2022-06-21 PROCEDURE — 99212 OFFICE O/P EST SF 10 MIN: CPT | Mod: PBBFAC,27,PN

## 2022-06-21 PROCEDURE — 99211 OFF/OP EST MAY X REQ PHY/QHP: CPT | Mod: PBBFAC,PO

## 2022-06-21 PROCEDURE — 99999 PR PBB SHADOW E&M-EST. PATIENT-LVL II: CPT | Mod: PBBFAC,,,

## 2022-06-21 PROCEDURE — 99999 PR PBB SHADOW E&M-EST. PATIENT-LVL II: ICD-10-PCS | Mod: PBBFAC,,,

## 2022-06-21 PROCEDURE — 99214 OFFICE O/P EST MOD 30 MIN: CPT | Mod: 25,24,S$PBB, | Performed by: NURSE PRACTITIONER

## 2022-06-21 PROCEDURE — 99999 PR PBB SHADOW E&M-EST. PATIENT-LVL I: ICD-10-PCS | Mod: PBBFAC,,,

## 2022-06-21 PROCEDURE — 99999 PR PBB SHADOW E&M-EST. PATIENT-LVL I: CPT | Mod: PBBFAC,,,

## 2022-06-21 NOTE — PROGRESS NOTES
Sergio Fagan was seen 06/21/2022 for an audiological evaluation. Patient complains of hearing loss and pain. Pt reports a Hx of loud noise exposure but denies tinnitus and family Hx of hearing loss. He has trouble hearing in noisy environments.      Results reveal a mild to profound sensorineural hearing loss for the right ear, and  moderate-to-profound sensorineural hearing loss for the left ear.    Speech Reception Thresholds were 25 dBHL for the right ear and 30 dBHL for the left ear.    Word recognition scores were good for the right ear and good for the left ear. Significant changes were noted when compared to previous hearing testing from 5/7/2020.      Audiogram results were reviewed in detail with patient and all questions were answered. Results will be reviewed by ENT at the completion of this note. Recommend further medical evaluation for the significant decrease in thresholds AU, binaural amplification pending medical clearance, repeat hearing test in one year due to noise exposure and hearing protection in loud noise. He will check with insurance and determine his benefits.

## 2022-06-21 NOTE — PATIENT INSTRUCTIONS
Tinnitus (Ringing in the Ears)  Tinnitus is the term for a noise in your ear not caused by an outside sound. The noise might be a ringing, buzzing, hissing, roaring. It can vary in pitch and may be soft or quite loud. For some people, tinnitus is a minor nuisance. But for others, the noise can make it hard to hear, work, and even sleep. When tinnitus can't be cured, a number of treatments may offer relief.  What causes tinnitus?  Loud noises, hearing loss, and ear wax can cause tinnitus. So can certain medicines. Large amounts of aspirin or caffeine are sometimes to blame. In many cases, the exact cause of tinnitus is unknown.  How is tinnitus treated?  Identifying and removing the cause is the best way to treat tinnitus. For that reason, your healthcare provider may refer you to an otolaryngologist (ear, nose, and throat doctor). Your hearing may also be checked by an audiologist (hearing specialist). If you have hearing loss, wearing a hearing aid may help your tinnitus. When the cause can't be found, the tinnitus itself may be treated. Some of the treatments are listed below, and your healthcare provider can tell you more about them:  Avoid exposure to loud sounds, which will exacerbate tinnitus.  Wear ear protection around loud noises.  Get your blood pressure check regularly.  If it is high, see your doctor.  Check with your PCP whether labs can be done to check for anemia and hyperthyroid, as these can worsen tinnitus.   Decrease salt intake.  Avoid stimulants such as caffeine and tobacco.  Exercise daily to improve circulation. Poor circulation worsens tinnitus.   Avoid sleep deprivation. Sleep deprivation and insomnia can worsen tinnitus. Get adequate rest.  Reduce aspirin use, if possible. Aspirin as well as NSAIDs and narcotic pain relievers can exacerbate tinnitus.   Stop worrying about the noise.  Stress worsens tinnitus. Recognize the noise as an annoyance and learn to ignore it as much as possible.  Patients with higher rates of anxiety, depression, concentration, or problems sleeping may need to discuss taking something for anxiety or depression with their primary care provider.     Consider a trial of lipoflavonoids, slow-release niacin, or Arches' Tinnitus Formula (over-the-counter).  Purchase a white noise machine to mask tinnitus.  The [place] Tinnitus Relief kelsey on your smart phone uses a combination of sounds and relaxing exercises that aim to distract your brain from focusing on tinnitus. Over time the brain learns to focus less on the tinnitus.   When no underlying pathology can be identified, an audiogram is needed to screen for hearing loss. If hearing loss is noted, hearing aids with masking technology are recommended to help relieve tinnitus.   Maskers are small devices that look like hearing aids. They emit a pleasant sound that helps cover up the ringing in your ears, similar to the technology used in noise-cancelling headphones. Hearing aids and maskers are sometimes used together.  Cognitive Behavioral Therapy (CBT), otherwise known as Tinnitus Retraining Therapy (TRT), can be done by either an audiologist or a cognitive behavioral therapist who is certified in TRT. Tinnitus retraining therapy combines biofeedback, counseling and maskers.   Medicines that treat anxiety and depression may ease tinnitus in some people.  Hypnosis or relaxation therapy may help noise seem less severe.    First-line treatment for tinnitus:  Elimination of exacerbating factors such as elevated blood pressure, high sodium intake, caffeine/stimulants, sleep deprivation/insomnia, certain medications, aspirin, exposure to loud sounds, etc. White noise is recommended as first-line treatment.    Second-line treatment for tinnitus:  Maskers (with or without the use of a hearing aid depending on the outcome of your hearing test) and/or Cognitive Behavior Therapy (Tinnitus Retraining Therapy).  To find an audiologist or  Cognitive Behavioral Therapist in your area who is certified in Tinnitus Retraining Therapy, you must contact the American Tinnitus Association at 1-954.308.2239 or www.zahraa.org.    For more information  American Speech-Hearing-Language Association [phone] www.abbie.org  American Tinnitus Association [phone] www.[email or web address]  National [place] on Deafness and other Communication Disorders [phone] www.nidcd.nih.gov

## 2022-06-21 NOTE — PROGRESS NOTES
"Subjective:       Patient ID: Sergio Fagan is a 76 y.o. male.    Chief Complaint: No chief complaint on file.    HPI   Patient was last seen by me >2 years ago for bilateral sensorineural hearing loss. Cleared medically for hearing aids.   Patient returns today stating tinnitus is worsening and becoming harder to hear over the tinnitus. Saying "What?" a lot more.     Review of Systems   Constitutional: Negative.    HENT: Positive for hearing loss and tinnitus.    Eyes: Negative.    Respiratory: Negative.    Cardiovascular: Negative.    Gastrointestinal: Negative.    Musculoskeletal: Negative.    Integumentary:  Negative.   Neurological: Negative.    Hematological: Negative.    Psychiatric/Behavioral: Negative.          Objective:      Physical Exam  Vitals and nursing note reviewed.   Constitutional:       General: He is not in acute distress.     Appearance: He is well-developed. He is not ill-appearing or diaphoretic.   HENT:      Head: Normocephalic and atraumatic.      Right Ear: Hearing, tympanic membrane, ear canal and external ear normal. No middle ear effusion. Tympanic membrane is not erythematous.      Left Ear: Hearing, tympanic membrane, ear canal and external ear normal.  No middle ear effusion. Tympanic membrane is not erythematous.      Nose: Nose normal.      Mouth/Throat:      Dentition: Has dentures.      Pharynx: Uvula midline.   Eyes:      General: Lids are normal. No scleral icterus.        Right eye: No discharge.         Left eye: No discharge.   Neck:      Trachea: Trachea normal. No tracheal deviation.   Cardiovascular:      Rate and Rhythm: Normal rate.   Pulmonary:      Effort: Pulmonary effort is normal. No respiratory distress.      Breath sounds: No stridor. No wheezing.   Musculoskeletal:         General: Normal range of motion.      Cervical back: Normal range of motion and neck supple.   Skin:     General: Skin is warm and dry.      Coloration: Skin is not pale.   Neurological: "      Mental Status: He is alert and oriented to person, place, and time.      Coordination: Coordination normal.      Gait: Gait normal.   Psychiatric:         Speech: Speech normal.         Behavior: Behavior normal. Behavior is cooperative.         Thought Content: Thought content normal.         Judgment: Judgment normal.       SEPARATE PROCEDURE IN OFFICE:   Procedure: Removal of impacted cerumen, LEFT   Pre Procedure Diagnosis: Cerumen Impaction   Post Procedure Diagnosis: Cerumen Impaction   Verbal informed consent in regards to risk of trauma to ear canal, ear drum or hearing, discomfort during procedure and/or inability to remove cerumen impaction in one session or unforeseen events or complications.   No anesthesia.     Procedure in detail:   Ear canal visualized bilateral with appropriate size ear speculum utilizing Operating Head Binocular Otomicroscope   Utilizing the following: delicate alligator forceps used. The impacted cerumen of the ear canals was removed atraumatically. The TM and EAC were then inspected and found to be clear of wax. See description of TMs/EACs in PE above.   Complications: No   Condition: Improved/Good     Assessment:       Problem List Items Addressed This Visit    None     Visit Diagnoses     Impacted cerumen of left ear    -  Primary    Tinnitus, bilateral        Bilateral sensorineural hearing loss              Plan:     Tinnitus handout given and discussed at length. Discussed elimination of exacerbating factors such as elevated blood pressure, high sodium intake, caffeine/stimulants, sleep deprivation/insomnia, certain medications, exposure to loud sounds, etc. Discussed white noise, hearing aids w/masking technology, and tinnitus retraining therapy (TRT). All questions answered.     PATIENT IS MEDICALLY CLEARED FOR HEARING AIDS. The patient's hearing loss is not due to temporarily, correctable physical condition. There are no contraindications to hearing aid candidacy.  Patient's audiogram reveals the patient is a candidate for amplification. Audiogram is reviewed in detail with the patient. The audiologist's recommendation that the patient have amplification/hearing aids is discussed and questions answered. Patient has been given information by the audiologist on how to schedule a hearing aid consultation. Patient is encouraged to wear ear protection in loud noise and return annually for hearing test. Return to clinic as needed for further ENT concerns.

## 2022-06-21 NOTE — TELEPHONE ENCOUNTER
Pt calling to ask my opinion of surgical options for skin closure. Explained to pt that he needed to consider what Dr Oliva discussed with him and clinic, and make the choice that he felt was best for him. Pt verbalized understanding. ----- Message from Romy Badillo, Patient Care Assistant sent at 6/21/2022  1:49 PM CDT -----  Type: Needs Medical Advice  Who Called:  jayy Spears Call Back Number: [phone]    Additional Information: jayy is wanting to speak with nurse ,, please call to further discuss, thank you

## 2022-06-21 NOTE — PROGRESS NOTES
Pt s/p excision of lesion above the R eyebrow on 6/15/22 by Dr Oliva.  Pt in today for wound assessment.  Surgical wound moist with granulation tissue noted.  No s/s of infection, pt denies pain.  Reviewed wound care instructions, all questions answered to pt's satisfaction.  Dr Oliva in to assess wound and discuss reconstruction.  Pt will be called with surgery date.

## 2022-06-22 DIAGNOSIS — Z98.890 MOHS DEFECT: Primary | ICD-10-CM

## 2022-06-22 DIAGNOSIS — L98.8 MOHS DEFECT: Primary | ICD-10-CM

## 2022-06-23 ENCOUNTER — TELEPHONE (OUTPATIENT)
Dept: HEMATOLOGY/ONCOLOGY | Facility: CLINIC | Age: 77
End: 2022-06-23
Payer: MEDICARE

## 2022-06-23 DIAGNOSIS — Z01.818 PRE-OP TESTING: Primary | ICD-10-CM

## 2022-06-23 NOTE — TELEPHONE ENCOUNTER
Called pt to give surgery date with Dr Oliva, no answer and per pt's phone does not use voice mail.  Called pt's son and requested that he have pt call me back.

## 2022-06-23 NOTE — TELEPHONE ENCOUNTER
Pt returned call, given surgery date with Dr Oliva and post op appointment dates and times.  All questions answered to pt's satisfaction.

## 2022-06-24 ENCOUNTER — TELEPHONE (OUTPATIENT)
Dept: HEMATOLOGY/ONCOLOGY | Facility: CLINIC | Age: 77
End: 2022-06-24
Payer: MEDICARE

## 2022-06-27 ENCOUNTER — TELEPHONE (OUTPATIENT)
Dept: HEMATOLOGY/ONCOLOGY | Facility: CLINIC | Age: 77
End: 2022-06-27
Payer: MEDICARE

## 2022-06-27 NOTE — TELEPHONE ENCOUNTER
Called pt and answered all questions to pt's satisfaction.  Pt verbalized appreciation for phone call.----- Message from Stefanie Oliva MD sent at 6/27/2022 12:07 PM CDT -----  Regarding: RE: Nervous pt  Skin will come from neck or if possible I will close it with a local flap  Recovery 2 weeks  Please elt him know  Thanks  ----- Message -----  From: Ofe Grover RN  Sent: 6/27/2022   9:10 AM CDT  To: Stefanie Oliva MD  Subject: Nervous pt                                       Good morning,  Pt is Very Nervous about surgery.  Cannot remember where skin graft will come from, how surgery and recovery will proceed.      He would love to speak with you.    Thank you.

## 2022-06-27 NOTE — TELEPHONE ENCOUNTER
Pt calling to speak with Dr Oliva about surgery scheduled Wednesday, 6/29/22.  Pt confused about graft site.  Message given to Dr Oliva.----- Message from Felicitas Cowart sent at 6/27/2022  8:36 AM CDT -----  Type: Needs Medical Advice  Who Called:  Patient  Symptoms (please be specific):    How long has patient had these symptoms:    Pharmacy name and phone #:    Best Call Back Number:[phone]   Additional Information: Patient is requesting a call back from Ofe regarding his upcoming procedure.

## 2022-06-27 NOTE — TELEPHONE ENCOUNTER
----- Message from Felicitas Cowart sent at 6/27/2022 10:19 AM CDT -----  Type: Needs Medical Advice  Who Called:  Patient   Symptoms (please be specific):    How long has patient had these symptoms:    Pharmacy name and phone #:    Best Call Back Number: [phone]  Additional Information: Patient is requesting a call back from Ofe regarding upcoming procedure.

## 2022-06-29 DIAGNOSIS — G89.18 POST-OP PAIN: Primary | ICD-10-CM

## 2022-06-29 RX ORDER — HYDROCODONE BITARTRATE AND ACETAMINOPHEN 7.5; 325 MG/1; MG/1
1 TABLET ORAL EVERY 6 HOURS PRN
Qty: 28 TABLET | Refills: 0 | Status: SHIPPED | OUTPATIENT
Start: 2022-06-29 | End: 2022-07-06

## 2022-07-06 ENCOUNTER — CLINICAL SUPPORT (OUTPATIENT)
Dept: HEMATOLOGY/ONCOLOGY | Facility: CLINIC | Age: 77
End: 2022-07-06
Payer: MEDICARE

## 2022-07-06 VITALS
OXYGEN SATURATION: 98 % | SYSTOLIC BLOOD PRESSURE: 141 MMHG | DIASTOLIC BLOOD PRESSURE: 68 MMHG | TEMPERATURE: 97 F | HEART RATE: 66 BPM

## 2022-07-06 PROCEDURE — 99212 OFFICE O/P EST SF 10 MIN: CPT | Mod: PBBFAC,PN

## 2022-07-06 PROCEDURE — 99999 PR PBB SHADOW E&M-EST. PATIENT-LVL II: ICD-10-PCS | Mod: PBBFAC,,,

## 2022-07-06 PROCEDURE — 99999 PR PBB SHADOW E&M-EST. PATIENT-LVL II: CPT | Mod: PBBFAC,,,

## 2022-07-06 NOTE — PROGRESS NOTES
Pt s/p closure of R eyebrow defect by Dr Oliva 6/29/22, in today for wound assessment.  Pt reports pain at incision site and around forehead at 4/10, states he has been taking OTC medications with good results.  Sutures to eyebrow line and forehead intact with wound edges approximated.  No s/s of infection.  Suture line with some old dried bloody drainage. Wound areas cleansed with saline and antibiotic ointment applied.  Pt tolerated well.  Wound healing, wound care, pain management and follow up visits reviewed.  All questions answered to pt's satisfaction.

## 2022-07-07 ENCOUNTER — TELEPHONE (OUTPATIENT)
Dept: DERMATOLOGY | Facility: CLINIC | Age: 77
End: 2022-07-07
Payer: MEDICARE

## 2022-07-07 NOTE — TELEPHONE ENCOUNTER
Spoke w/ pt. Informed pt about results and recommendations per provider. pt verbalized understanding.    appt 7/29/2022 at 1 pm.

## 2022-07-07 NOTE — TELEPHONE ENCOUNTER
----- Message from Edmond Cagle MD sent at 6/17/2022  1:23 PM CDT -----  Hey, let's schedule for ED&C of these for Dr. Pop. Thanks. -ko    1. Skin, left neck, shave biopsy:   - ACTINIC KERATOSIS WITH FOCAL TRANSITION TO SQUAMOUS CELL CARCINOMA IN SITU.     2. Skin, right neck, shave biopsy:   - SQUAMOUS CELL CARCINOMA IN SITU, AT LEAST.     3.  Skin, left dorsal hand, shave biopsy:   - SQUAMOUS CELL CARCINOMA IN SITU.

## 2022-07-12 ENCOUNTER — OFFICE VISIT (OUTPATIENT)
Dept: HEMATOLOGY/ONCOLOGY | Facility: CLINIC | Age: 77
End: 2022-07-12
Payer: MEDICARE

## 2022-07-12 VITALS
SYSTOLIC BLOOD PRESSURE: 125 MMHG | HEIGHT: 67 IN | TEMPERATURE: 98 F | WEIGHT: 169.56 LBS | RESPIRATION RATE: 18 BRPM | BODY MASS INDEX: 26.61 KG/M2 | HEART RATE: 78 BPM | DIASTOLIC BLOOD PRESSURE: 65 MMHG | OXYGEN SATURATION: 97 %

## 2022-07-12 DIAGNOSIS — G51.0 FACIAL NERVE PARESIS: ICD-10-CM

## 2022-07-12 DIAGNOSIS — S01.80XD OPEN WOUND OF FACE WITHOUT COMPLICATION, SUBSEQUENT ENCOUNTER: ICD-10-CM

## 2022-07-12 PROBLEM — S01.80XA OPEN WOUND, FACE, WITHOUT COMPLICATION: Status: ACTIVE | Noted: 2022-07-12

## 2022-07-12 PROCEDURE — 99999 PR PBB SHADOW E&M-EST. PATIENT-LVL IV: ICD-10-PCS | Mod: PBBFAC,,, | Performed by: OTOLARYNGOLOGY

## 2022-07-12 PROCEDURE — 99024 POSTOP FOLLOW-UP VISIT: CPT | Mod: POP,,, | Performed by: OTOLARYNGOLOGY

## 2022-07-12 PROCEDURE — 99999 PR PBB SHADOW E&M-EST. PATIENT-LVL IV: CPT | Mod: PBBFAC,,, | Performed by: OTOLARYNGOLOGY

## 2022-07-12 PROCEDURE — 99024 PR POST-OP FOLLOW-UP VISIT: ICD-10-PCS | Mod: POP,,, | Performed by: OTOLARYNGOLOGY

## 2022-07-12 PROCEDURE — 99214 OFFICE O/P EST MOD 30 MIN: CPT | Mod: PBBFAC,PN | Performed by: OTOLARYNGOLOGY

## 2022-07-12 NOTE — PROGRESS NOTES
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 Sharif Moreno.  Patient is a candidate for Moh's microsurgery but he reports that he will not be able to tolerate it and wishes IV sedation.    Patient reports that he noted a lesion involving his right eyebrow a few months ago. Lesion has grown. No bleeding. No pain. Hx of skin cancer extremities in past but not on face.  Hx of sun exposure.     Patient is concerned about his vision due to location of lesion. No eye complaints at this time.    7/12/2022  Patient is here today for postop suture removal from O to T flap closure of eyebrow and forehead defect.    ROS: see HPI  Constitutional: Negative for activity change and appetite change, weight loss.   Eyes: Negative for discharge, visual changes.   Respiratory: Negative for difficulty breathing and wheezing   Cardiovascular: Negative for chest pain.   Gastrointestinal: Negative for abdominal distention and abdominal pain.   Endocrine: Negative for cold intolerance and heat intolerance.   Genitourinary: Negative for dysuria.   Musculoskeletal: Negative for gait problem, muscle pain and joint swelling.   Skin: Negative for color change and pallor; negative for skin lesions.   Neurological: Negative for syncope and weakness; no numbness face.   Psychiatric/Behavioral: Negative for agitation and confusion; negative for depression.    Physical Exam:      Constitutional  · General Appearance: well nourished, well-developed, alert, oriented, in no acute distress  · Communication: ability, understanding, normal  Head and Face  · Inspection: normocephalic, atraumatic, suture line intact  Eyes  · Normal alignment,  motility, no proptosis, enophthalmus or nystagmus; good eye closure; lateral aspect of eyebrow missing    Neurological  · Cranial Nerves: unable to elevate eyebrow  · General: no focal deficits  · Mood and Affect: no depression, anxiety or agitation  :  Final Pathologic Diagnosis Skin, right brow, shave biopsy:   -SQUAMOUS CELL CARCINOMA,  SUPERFICIALLY INVASIVE, EXCISED IN THE PLANES OF   SECTIONS EXAMINED   This lesion is skin cancer. You will be contacted regarding treatment.    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 Undermining of forehead skin was in the subq level. Temporal branch of facial nerve should have been spared. Lack of movement may be due to injury to temporal branch versus scarring    Plan:  Suture removal  Wound care instructions given

## 2022-07-20 PROBLEM — G51.0 FACIAL NERVE PARESIS: Status: ACTIVE | Noted: 2022-07-20

## 2022-08-11 ENCOUNTER — TELEPHONE (OUTPATIENT)
Dept: HEMATOLOGY/ONCOLOGY | Facility: CLINIC | Age: 77
End: 2022-08-11
Payer: MEDICARE

## 2022-08-11 NOTE — TELEPHONE ENCOUNTER
Assisted pt rescheduling appt with Dr Oliva.----- Message from Rudy Gramajo sent at 8/10/2022  5:25 PM CDT -----       Type: Patient Returning Call    Who Called: Patient   Who Left Message for Patient: NA    Does the patient know what this is regarding?: Changing time of appointment on 08/15/2022 to a later time, patient has another appointment at a different facility. He says if he can not be scheduled for a later time on 08/15/2022, he is available to accept an early morning appointment on Tuesday, Wednesday, or Thursday.     Would the patient rather a call back or a response via [facility]? Call   Best Call Back Number: [phone]  Additional Information: Please assist, thank you!

## 2022-08-12 ENCOUNTER — PROCEDURE VISIT (OUTPATIENT)
Dept: DERMATOLOGY | Facility: CLINIC | Age: 77
End: 2022-08-12
Payer: MEDICARE

## 2022-08-12 DIAGNOSIS — D04.62 SQUAMOUS CELL CARCINOMA IN SITU (SCCIS) OF SKIN OF LEFT HAND: ICD-10-CM

## 2022-08-12 DIAGNOSIS — C76.0 PRIMARY SQUAMOUS CELL CARCINOMA OF HEAD AND NECK: ICD-10-CM

## 2022-08-12 DIAGNOSIS — D04.4 SQUAMOUS CELL CARCINOMA IN SITU (SCCIS) OF SKIN OF NECK: Primary | ICD-10-CM

## 2022-08-12 PROCEDURE — 99499 NO LOS: ICD-10-PCS | Mod: S$PBB,,, | Performed by: DERMATOLOGY

## 2022-08-12 PROCEDURE — 17272 DSTR MAL LES S/N/H/F/G 1.1-2: CPT | Mod: PBBFAC,PO | Performed by: DERMATOLOGY

## 2022-08-12 PROCEDURE — 99499 UNLISTED E&M SERVICE: CPT | Mod: S$PBB,,, | Performed by: DERMATOLOGY

## 2022-08-12 PROCEDURE — 17272 DSTR MAL LES S/N/H/F/G 1.1-2: CPT | Mod: S$PBB,,, | Performed by: DERMATOLOGY

## 2022-08-12 PROCEDURE — 17271 DSTR MAL LES S/N/H/F/G 0.6-1: CPT | Mod: 59,PBBFAC,PO | Performed by: DERMATOLOGY

## 2022-08-12 PROCEDURE — 17271 DSTR MAL LES S/N/H/F/G 0.6-1: CPT | Mod: 59,S$PBB,, | Performed by: DERMATOLOGY

## 2022-08-12 PROCEDURE — 17271 PR DESTR MALIG SCAL,NCK,HAND 0.6-1 CM: ICD-10-PCS | Mod: 59,S$PBB,, | Performed by: DERMATOLOGY

## 2022-08-12 PROCEDURE — 17272 PR DESTR MALIG SCAL,NCK,HAND 1.1-2 CM: ICD-10-PCS | Mod: S$PBB,,, | Performed by: DERMATOLOGY

## 2022-08-12 NOTE — PROGRESS NOTES
1. Skin, left neck, shave biopsy:   - ACTINIC KERATOSIS WITH FOCAL TRANSITION TO SQUAMOUS CELL CARCINOMA IN SITU.   - MARGINS ARE NEGATIVE IN THE PLANES OF SECTION.   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  - Discussed malignant diagnosis with patient and need for definitive treatment.   - Discussed treatment options with patient, including the risks and benefits of each. Patient opted for EDC.   - Electrodessication and Curettage Procedure Note: Discussed procedure with patient/patient's guardian including risks and benefits as well as treatment alternatives. Risks of procedure include pain, bleeding, infection, post-inflammatory pigmentary alteration, scar, recurrence. Verbal consent obtained. Area to be treated marked and cleansed with alcohol. Local anesthesia achieved by injection of 1% lidocaine with epinephrine. Curettage and thermal cautery x 3 performed. Lesion size after primary curettage: 0.8 cm. Petroleum jelly and bandage applied to wound. Patient tolerated procedure well. After-visit wound care instructions reviewed and provided in writing. F/u 3 months, PRN sooner.        2. Skin, right neck, shave biopsy:   - SQUAMOUS CELL CARCINOMA IN SITU, AT LEAST.   - THE ATYPICAL SQUAMOUS EPITHELIUM EXTENDS TO THE BASE OF THE BIOPSY, AND AN   UNDERLYING INVASIVE SQUAMOUS CELL CARCINOMA CANNOT BE EXCLUDED.   Primary squamous cell carcinoma of head and neck  - Discussed diagnosis, etiology, and treatment options.  - Discussed malignant diagnosis with patient and need for definitive treatment.   - Discussed treatment options with patient, including the risks and benefits of each. Patient opted for EDC.   - Electrodessication and Curettage Procedure Note: Discussed procedure with patient/patient's guardian including risks and benefits as well as treatment alternatives. Risks of procedure include pain, bleeding, infection, post-inflammatory  pigmentary alteration, scar, recurrence. Verbal consent obtained. Area to be treated marked and cleansed with alcohol. Local anesthesia achieved by injection of 1% lidocaine with epinephrine. Curettage and thermal cautery x 3 performed. Lesion size after primary curettage: 1.2 cm. Petroleum jelly and bandage applied to wound. Patient tolerated procedure well. After-visit wound care instructions reviewed and provided in writing. F/u 3 months, PRN sooner.        3.  Skin, left dorsal hand, shave biopsy:   - SQUAMOUS CELL CARCINOMA IN SITU.   - MARGINS ARE NEGATIVE IN THE PLANES OF SECTION.   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  - Discussed malignant diagnosis with patient and need for definitive treatment.   - Discussed treatment options with patient, including the risks and benefits of each. Patient opted for EDC.   - Electrodessication and Curettage Procedure Note: Discussed procedure with patient/patient's guardian including risks and benefits as well as treatment alternatives. Risks of procedure include pain, bleeding, infection, post-inflammatory pigmentary alteration, scar, recurrence. Verbal consent obtained. Area to be treated marked and cleansed with alcohol. Local anesthesia achieved by injection of 1% lidocaine with epinephrine. Curettage and thermal cautery x 3 performed. Lesion size after primary curettage: 1.2 cm. Petroleum jelly and bandage applied to wound. Patient tolerated procedure well. After-visit wound care instructions reviewed and provided in writing. F/u 3 months, PRN sooner.

## 2022-08-15 NOTE — PROGRESS NOTES
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 Sharif Moreno.  Patient is a candidate for Moh's microsurgery but he reports that he will not be able to tolerate it and wishes IV sedation.    Patient reports that he noted a lesion involving his right eyebrow a few months ago. Lesion has grown. No bleeding. No pain. Hx of skin cancer extremities in past but not on face.  Hx of sun exposure.     Patient is concerned about his vision due to location of lesion. No eye complaints at this time.    7/12/2022  Patient is here today for postop suture removal from O to T flap closure of eyebrow and forehead defect.    8/16/2022  Patient is here today for postop visit.  If the last time of examination, he had brow ptosis.    Today he complains that he has lost some of his right peripheral vision due to brow ptosis and that his right eye does not open as well as his left eye.    ROS: see HPI  Constitutional: Negative for activity change and appetite change, weight loss.   Eyes: Negative for discharge, visual changes.   Respiratory: Negative for difficulty breathing and wheezing   Cardiovascular: Negative for chest pain.   Gastrointestinal: Negative for abdominal distention and abdominal pain.   Endocrine: Negative for cold intolerance and heat intolerance.   Genitourinary: Negative for dysuria.   Musculoskeletal: Negative for gait problem, muscle pain and joint swelling.   Skin: Negative for color change and pallor; negative for skin lesions.   Neurological: Negative for syncope and weakness; no numbness face.   Psychiatric/Behavioral: Negative for agitation and confusion; negative for depression.    Physical  Exam:      Constitutional  · General Appearance: well nourished, well-developed, alert, oriented, in no acute distress  · Communication: ability, understanding, normal  Head and Face  Inspection:     Incision well healed     Eyes  · Normal alignment, motility, no proptosis, enophthalmus or nystagmus; good eye closure; aperture of right eye is much smaller than left; when patient opens his eyes there were some spasms of the orbicularis oculi muscle inferior eyelid; right brow ptosis; numbness of forehead skin; photos in Epic describing eye findings    Neurological  · Cranial Nerves: unable to elevate eyebrow  · General: no focal deficits  · Mood and Affect: no depression, anxiety or agitation  :  Final Pathologic Diagnosis Skin, right brow, shave biopsy:   -SQUAMOUS CELL CARCINOMA,  SUPERFICIALLY INVASIVE, EXCISED IN THE PLANES OF   SECTIONS EXAMINED   This lesion is skin cancer. You will be contacted regarding treatment.    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 Tyrone Alves for brow lift and possible Botox to the inferior eyelid to prevent spasm.  Patient does not wish to travel to the [place] to see Dr. Alexandro Oliva.

## 2022-08-16 ENCOUNTER — OFFICE VISIT (OUTPATIENT)
Dept: HEMATOLOGY/ONCOLOGY | Facility: CLINIC | Age: 77
End: 2022-08-16
Payer: MEDICARE

## 2022-08-16 ENCOUNTER — TELEPHONE (OUTPATIENT)
Dept: HEMATOLOGY/ONCOLOGY | Facility: CLINIC | Age: 77
End: 2022-08-16

## 2022-08-16 ENCOUNTER — TELEPHONE (OUTPATIENT)
Dept: HEMATOLOGY/ONCOLOGY | Facility: CLINIC | Age: 77
End: 2022-08-16
Payer: MEDICARE

## 2022-08-16 VITALS
HEIGHT: 67 IN | RESPIRATION RATE: 18 BRPM | HEART RATE: 73 BPM | WEIGHT: 168 LBS | SYSTOLIC BLOOD PRESSURE: 130 MMHG | BODY MASS INDEX: 26.37 KG/M2 | OXYGEN SATURATION: 94 % | TEMPERATURE: 98 F | DIASTOLIC BLOOD PRESSURE: 78 MMHG

## 2022-08-16 DIAGNOSIS — S01.80XD OPEN WOUND OF FACE WITHOUT COMPLICATION, SUBSEQUENT ENCOUNTER: ICD-10-CM

## 2022-08-16 DIAGNOSIS — H57.811 BROW PTOSIS, RIGHT: Primary | ICD-10-CM

## 2022-08-16 DIAGNOSIS — G51.0 FACIAL NERVE PARESIS: ICD-10-CM

## 2022-08-16 PROCEDURE — 99999 PR PBB SHADOW E&M-EST. PATIENT-LVL V: ICD-10-PCS | Mod: PBBFAC,,, | Performed by: OTOLARYNGOLOGY

## 2022-08-16 PROCEDURE — 99024 PR POST-OP FOLLOW-UP VISIT: ICD-10-PCS | Mod: POP,,, | Performed by: OTOLARYNGOLOGY

## 2022-08-16 PROCEDURE — 99215 OFFICE O/P EST HI 40 MIN: CPT | Mod: PBBFAC,PN | Performed by: OTOLARYNGOLOGY

## 2022-08-16 PROCEDURE — 99024 POSTOP FOLLOW-UP VISIT: CPT | Mod: POP,,, | Performed by: OTOLARYNGOLOGY

## 2022-08-16 PROCEDURE — 99999 PR PBB SHADOW E&M-EST. PATIENT-LVL V: CPT | Mod: PBBFAC,,, | Performed by: OTOLARYNGOLOGY

## 2022-08-16 NOTE — TELEPHONE ENCOUNTER
Pt calling to discuss Botox and plastic surgery options.  Questions answered.  Awaiting Dr Oliva's recommendations for other plastic surgeons. Pt verbalized understanding. ----- Message from Felicitas Cowart sent at 8/16/2022  4:04 PM CDT -----  Type: Needs Medical Advice  Who Called: Patient   Symptoms (please be specific):    How long has patient had these symptoms:    Pharmacy name and phone #:    Best Call Back Number: [phone]  Additional Information: Patient is requesting a call back from [facility].

## 2022-08-17 ENCOUNTER — TELEPHONE (OUTPATIENT)
Dept: HEMATOLOGY/ONCOLOGY | Facility: CLINIC | Age: 77
End: 2022-08-17
Payer: MEDICARE

## 2022-08-17 NOTE — TELEPHONE ENCOUNTER
Pt calling to inform Dr Oliva that he will be seeing Dr Edmond Rodriguez in plastics.  Will notify Dr Oliva.----- Message from Romy Badillo, Patient Care Assistant sent at 8/17/2022  7:38 AM CDT -----  Type: Needs Medical Advice  Who Called:  jayy Spears Call Back Number: [phone]    Additional Information: patient is calling to speak with kitty , please call to further discuss, thank you ,

## 2022-08-22 ENCOUNTER — TELEPHONE (OUTPATIENT)
Dept: HEMATOLOGY/ONCOLOGY | Facility: CLINIC | Age: 77
End: 2022-08-22
Payer: MEDICARE

## 2022-08-22 NOTE — NURSING
Chart Reviewed. Plan per Dr. Oliva is to refer to plastic surgeon for brow lift and possible Botox to the inferior eyelid to prevent spasm.  Patient does not wish to travel to the [place] to see Dr. Alexandro Oliva. Per the chart note, he will see Dr. Rodriguez. No further cancer navigation needs at this time

## 2022-09-06 ENCOUNTER — TELEPHONE (OUTPATIENT)
Dept: DERMATOLOGY | Facility: CLINIC | Age: 77
End: 2022-09-06
Payer: MEDICARE

## 2022-09-06 NOTE — TELEPHONE ENCOUNTER
----- Message from Gabby Moore sent at 9/6/2022  7:35 AM CDT -----  Contact: self  Patient is requesting an appt to get his spots on his arm removed, I couldn't come up with anything in the near future.  Call back at [phone] and thanks

## 2022-09-14 ENCOUNTER — TELEPHONE (OUTPATIENT)
Dept: HEMATOLOGY/ONCOLOGY | Facility: CLINIC | Age: 77
End: 2022-09-14
Payer: MEDICARE

## 2022-09-14 NOTE — TELEPHONE ENCOUNTER
Pt called and requested that Dr Oliva's referral to Dr EMY Rodriguez be refaxed to Dr Rodriguez's office.  Referral faxed as requested.

## 2022-09-15 PROBLEM — G20.A1 PARKINSON DISEASE: Status: ACTIVE | Noted: 2022-09-15

## 2022-09-22 ENCOUNTER — OFFICE VISIT (OUTPATIENT)
Dept: DERMATOLOGY | Facility: CLINIC | Age: 77
End: 2022-09-22
Payer: MEDICARE

## 2022-09-22 VITALS — HEIGHT: 67 IN | BODY MASS INDEX: 26.53 KG/M2 | RESPIRATION RATE: 18 BRPM | WEIGHT: 169.06 LBS

## 2022-09-22 DIAGNOSIS — L57.8 ACTINIC SKIN DAMAGE: Primary | ICD-10-CM

## 2022-09-22 DIAGNOSIS — L82.1 SK (SEBORRHEIC KERATOSIS): ICD-10-CM

## 2022-09-22 DIAGNOSIS — D48.5 NEOPLASM OF UNCERTAIN BEHAVIOR OF SKIN: ICD-10-CM

## 2022-09-22 DIAGNOSIS — D04.62 SQUAMOUS CELL CARCINOMA IN SITU (SCCIS) OF SKIN OF LEFT FOREARM: ICD-10-CM

## 2022-09-22 DIAGNOSIS — L82.0 INFLAMED SEBORRHEIC KERATOSIS: ICD-10-CM

## 2022-09-22 DIAGNOSIS — L57.0 AK (ACTINIC KERATOSIS): ICD-10-CM

## 2022-09-22 DIAGNOSIS — C44.629 SQUAMOUS CELL CANCER OF SKIN OF LEFT FOREARM: ICD-10-CM

## 2022-09-22 PROCEDURE — 17261 DSTRJ MAL LES T/A/L .6-1.0CM: CPT | Mod: 59,S$PBB,, | Performed by: DERMATOLOGY

## 2022-09-22 PROCEDURE — 99999 PR PBB SHADOW E&M-EST. PATIENT-LVL IV: ICD-10-PCS | Mod: PBBFAC,,, | Performed by: DERMATOLOGY

## 2022-09-22 PROCEDURE — 99214 OFFICE O/P EST MOD 30 MIN: CPT | Mod: PBBFAC,PO,25 | Performed by: DERMATOLOGY

## 2022-09-22 PROCEDURE — 88305 TISSUE EXAM BY PATHOLOGIST: ICD-10-PCS | Mod: 26,,, | Performed by: PATHOLOGY

## 2022-09-22 PROCEDURE — 11103 TANGNTL BX SKIN EA SEP/ADDL: CPT | Mod: PBBFAC,PO | Performed by: DERMATOLOGY

## 2022-09-22 PROCEDURE — 99214 OFFICE O/P EST MOD 30 MIN: CPT | Mod: 25,S$PBB,, | Performed by: DERMATOLOGY

## 2022-09-22 PROCEDURE — 17110 DESTRUCTION B9 LES UP TO 14: CPT | Mod: S$PBB,59,, | Performed by: DERMATOLOGY

## 2022-09-22 PROCEDURE — 17110 PR DESTRUCTION BENIGN LESIONS UP TO 14: ICD-10-PCS | Mod: S$PBB,59,, | Performed by: DERMATOLOGY

## 2022-09-22 PROCEDURE — 11103 TANGNTL BX SKIN EA SEP/ADDL: CPT | Mod: S$PBB,,, | Performed by: DERMATOLOGY

## 2022-09-22 PROCEDURE — 11102 TANGNTL BX SKIN SINGLE LES: CPT | Mod: 59,S$PBB,, | Performed by: DERMATOLOGY

## 2022-09-22 PROCEDURE — 17004 DESTROY PREMAL LESIONS 15/>: CPT | Mod: PBBFAC,PO | Performed by: DERMATOLOGY

## 2022-09-22 PROCEDURE — 99214 PR OFFICE/OUTPT VISIT, EST, LEVL IV, 30-39 MIN: ICD-10-PCS | Mod: 25,S$PBB,, | Performed by: DERMATOLOGY

## 2022-09-22 PROCEDURE — 11103 PR TANGENTIAL BIOPSY, SKIN, EA ADDTL LESION: ICD-10-PCS | Mod: S$PBB,,, | Performed by: DERMATOLOGY

## 2022-09-22 PROCEDURE — 11102 TANGNTL BX SKIN SINGLE LES: CPT | Mod: 59,PBBFAC,PO | Performed by: DERMATOLOGY

## 2022-09-22 PROCEDURE — 17261 PR DESTR MALIG TRUNK,EXTREM 0.6-1 CM: ICD-10-PCS | Mod: 59,S$PBB,, | Performed by: DERMATOLOGY

## 2022-09-22 PROCEDURE — 11102 PR TANGENTIAL BIOPSY, SKIN, SINGLE LESION: ICD-10-PCS | Mod: 59,S$PBB,, | Performed by: DERMATOLOGY

## 2022-09-22 PROCEDURE — 17004 DESTROY PREMAL LESIONS 15/>: CPT | Mod: S$PBB,,, | Performed by: DERMATOLOGY

## 2022-09-22 PROCEDURE — 99999 PR PBB SHADOW E&M-EST. PATIENT-LVL IV: CPT | Mod: PBBFAC,,, | Performed by: DERMATOLOGY

## 2022-09-22 PROCEDURE — 17004 PR DESTRUCTION, PREMALIGNANT LESIONS; 15 OR MORE LESIONS: ICD-10-PCS | Mod: S$PBB,,, | Performed by: DERMATOLOGY

## 2022-09-22 PROCEDURE — 88305 TISSUE EXAM BY PATHOLOGIST: CPT | Mod: 59 | Performed by: PATHOLOGY

## 2022-09-22 PROCEDURE — 88305 TISSUE EXAM BY PATHOLOGIST: CPT | Mod: 26,,, | Performed by: PATHOLOGY

## 2022-09-22 PROCEDURE — 17261 DSTRJ MAL LES T/A/L .6-1.0CM: CPT | Mod: 59,PBBFAC,PO | Performed by: DERMATOLOGY

## 2022-09-22 RX ORDER — FLUOROURACIL 50 MG/G
CREAM TOPICAL
Qty: 40 G | Refills: 1 | Status: SHIPPED | OUTPATIENT
Start: 2022-09-22 | End: 2023-06-12

## 2022-09-22 NOTE — PROGRESS NOTES
Subjective:       Patient ID:  Sergio Fagan is a 76 y.o. male who presents for   Chief Complaint   Patient presents with    Lesion     HPI  Established patient.  Hx of NMSC as below.   Here for bumps at forearms, scalp, L clavicular region.   No further complaints today.     +pacemaker      Derm hx:  HAK w/ transition to SCCis - L neck s/p EDC 8/2022  SCCis R neck ED&C 8/12/22  SCC l dorsal hand - ed&C 8/12/22  SCC- R brow surgery w/ [facility]el on 6/15/22  Scc- R clavicle   SCC x 2 on scalp- Mohs per [facility] 9/13/19;  HAK with SCCIS on L neck 8/20/19 (no evidence of recurrence; no treatment)    Review of Systems   Constitutional:  Negative for fever and chills.   Skin:  Positive for dry skin, activity-related sunscreen use and wears hat.   Hematologic/Lymphatic: Bruises/bleeds easily (on ASA and Plavix).      Objective:    Physical Exam   Constitutional: He appears well-developed and well-nourished.   Neurological: He is alert and oriented to person, place, and time.   Psychiatric: He has a normal mood and affect.   Skin:   Areas Examined (abnormalities noted in diagram):   Scalp / Hair Palpated and Inspected  Head / Face Inspection Performed  RUE Inspected  LUE Inspection Performed                     Diagram Legend     Erythematous scaling macule/papule c/w actinic keratosis       Vascular papule c/w angioma      Pigmented verrucoid papule/plaque c/w seborrheic keratosis      Yellow umbilicated papule c/w sebaceous hyperplasia      Irregularly shaped tan macule c/w lentigo     1-2 mm smooth white papules consistent with Milia      Movable subcutaneous cyst with punctum c/w epidermal inclusion cyst      Subcutaneous movable cyst c/w pilar cyst      Firm pink to brown papule c/w dermatofibroma      Pedunculated fleshy papule(s) c/w skin tag(s)      Evenly pigmented macule c/w junctional nevus     Mildly variegated pigmented, slightly irregular-bordered macule c/w mildly atypical nevus      Flesh colored to evenly  pigmented papule c/w intradermal nevus       Pink pearly papule/plaque c/w basal cell carcinoma      Erythematous hyperkeratotic cursted plaque c/w SCC      Surgical scar with no sign of skin cancer recurrence      Open and closed comedones      Inflammatory papules and pustules      Verrucoid papule consistent consistent with wart     Erythematous eczematous patches and plaques     Dystrophic onycholytic nail with subungual debris c/w onychomycosis     Umbilicated papule    Erythematous-base heme-crusted tan verrucoid plaque consistent with inflamed seborrheic keratosis     Erythematous Silvery Scaling Plaque c/w Psoriasis     See annotation                  Assessment / Plan:      Pathology Orders:       Normal Orders This Visit    Specimen to Pathology, Dermatology     Questions:    Procedure Type: Dermatology and skin neoplasms    Number of Specimens: 5    ------------------------: -------------------------    Spec 1 Procedure: Biopsy    Spec 1 Clinical Impression: r/o SCC    Spec 1 Source: L distal dorsal forearm, proximal    ------------------------: -------------------------    Spec 2 Procedure: Biopsy    Spec 2 Clinical Impression: r/o SCC    Spec 2 Source: L distal dorsal forearm, distal    ------------------------: -------------------------    Spec 3 Procedure: Biopsy    Spec 3 Clinical Impression: lentigo/macular SK r/o LM    Spec 3 Source: L lateral neck, anterior    ------------------------: -------------------------    Spec 4 Procedure: Biopsy    Spec 4 Clinical Impression: r/o nmsc    Spec 4 Source: L lateral neck, posterior    ------------------------: -------------------------    Spec 5 Procedure: Biopsy    Spec 5 Clinical Impression: ISK r/o SCC    Spec 5 Source: L shoulder    Release to patient: Immediate          Neoplasm of uncertain behavior of skin - L lateral neck (anterior, posterior) and L shoulder  - Discussed diagnosis with patient and explained uncertain nature of condition, including  ddx.   - Discussed treatment options (biopsy, close monitoring) with patient, including the risks and benefits of each. Patient opted to pursue biopsy.  - Shave Biopsy Procedure Note: Discussed procedure with patient/patient's guardian including risks and benefits as well as treatment alternatives. Risks of procedure include pain, bleeding, infection, post-inflammatory pigmentary alteration, scar, recurrence. Patient informed that the purpose of a biopsy is sampling of condition in question rather than removal in entirety; further treatment may be necessary. Verbal consent obtained. Area to be biopsied marked and cleansed with alcohol. Local anesthesia achieved by injecting approximately 1 cc of 1% lidocaine with epinephrine. Three shave biopsies performed using a double edge razor blade; specimens submitted to pathology. Hemostasis achieved with aluminum chloride. Petroleum jelly and bandage applied to wounds. Patient tolerated procedures well. After-visit wound care instructions reviewed and provided in writing.     NUB - L distal dorsal forearm (distal, proximal)  - Discussed diagnosis with patient and explained suspicious nature of condition, including ddx.   - Discussed treatment options (biopsy +/- EDC, closing monitoring) with patient, including the risks and benefits of each. Patient opted to pursue biopsy followed by EDC  - Shave Biopsy Procedure Note: Discussed procedure with patient/patient's guardian including risks and benefits as well as treatment alternatives. Risks of procedure include pain, bleeding, infection, post-inflammatory pigmentary alteration, scar, recurrence. Patient informed that the purpose of a biopsy is sampling of condition in question rather than removal in entirety; further treatment may be necessary. Verbal consent obtained. Area to be biopsied marked and cleansed with alcohol. Local anesthesia achieved by injecting approximately 1 cc of 1% lidocaine with epinephrine. A double edge  razor blade used for biopsy; specimen submitted to pathology. Patient tolerated procedure well.   FOLLOWED IMMEDIATELY BY  - Electrodessication and Curettage Procedure Note: Discussed procedure with patient/patient's guardian including risks and benefits as well as treatment alternatives. Risks of procedure include pain, bleeding, infection, post-inflammatory pigmentary alteration, scar, recurrence. Verbal consent obtained. Curettage and desiccation x 3 performed. Lesion size after primary curettage: <1 cm x 2. Petroleum jelly and bandage applied to wound. Patient tolerated procedure well. After-visit wound care instructions reviewed and provided in writing. F/u 3 months, PRN sooner.     AK (actinic keratosis)  - Discussed diagnosis, etiology, and precancerous nature of condition.   - Cryosurgery Procedure Note: Discussed procedure with patient/patient's guardian including risks and benefits as well as treatment alternatives. Risks of procedure include pain, itching, swelling, redness, blistering, crusting, wound formation, post-inflammatory pigmentary alteration, scar, recurrence. Verbal consent obtained. LN2 cryosurgery performed to 15+ lesion(s). Patient tolerated procedure well. After-visit wound care instructions reviewed and provided in writing.      Actinic skin damage  -     fluorouraciL (EFUDEX) 5 % cream; AAA scalp bid x 2 weeks  Dispense: 40 g; Refill: 1  - Discussed diagnosis, etiology, and treatment options.  - Efudex BID x 2 weeks to scalp.   - Counseled on potential SE of medication(s) and instructed on use.    Inflamed seborrheic keratosis  - Discussed diagnosis, etiology, and treatment options.   - Cryosurgery Procedure Note: Discussed procedure with patient/patient's guardian including risks and benefits as well as treatment alternatives. Risks of procedure include pain, itching, swelling, redness, blistering, crusting, wound formation, post-inflammatory pigmentary alteration, scar, recurrence.  Verbal consent obtained. LN2 cryosurgery performed to 1 lesion(s). Patient tolerated procedure well. After-visit wound care instructions reviewed and provided in writing.     SK (seborrheic keratosis)  - Benign; reassured treatment not necessary.           Follow up pending path

## 2022-09-22 NOTE — PATIENT INSTRUCTIONS
WOUND CARE FOR YOUR BIOPSY    A band aid and Vaseline ointment has been placed over the site. This should remain in place for 24 hours. It is recommended that you keep the area dry for the first 24 hours. After 24 hours, you may remove the band aid and wash the area with warm soap and water and apply Vaseline ointment. Apply the Vaseline twice daily for 2 weeks to promote heal and prevent infections. It is important to keep the area moist. Letting it dry out and get air slows healing time and will worsen the scar. Band aid is optional after the first 24 hours. Please apply the Vaseline twice daily for 2 weeks to promote heal and prevent infections.    We recommend not using rubbing alcohol, hydrogen peroxide, witch hazel, Neosporin, or any triple antibiotic ointments (unless otherwise prescribed by the provider). This can delay and prevent healing of the wound.    IMPORTANT    If you notice increased redness, tenderness, pain, or yellow drainage at the site, please notify your doctor. These are signs of an infection.    If the procedure site is bleeding, apply firm pressure for 15 minutes straight. Repeat for another 15 minutes if it is still bleeding. If the surgical site continues to bleed, then please contact your doctor.    Notice to [facility] users    You will receive a [facility] notification as soon as the pathologist has finished reviewing your biopsy specimen. Due to the CARES Act you are now seeing your results immediately from the pathologist, sometimes even before the provider has had a chance to review the results and advise. We apologize but this was outside of our control. Here at [facility] we are constantly working on ways to make this process better. We know results can be complex and cause anxiety.   Please allow two (2) business days for the ordering provider to review the results and formulate a plan of care for you based on your results and their best judgement. Once the provider has addressed  your results and notified us of any recommendations this office will attempt to contact you.

## 2022-09-28 ENCOUNTER — TELEPHONE (OUTPATIENT)
Dept: HEMATOLOGY/ONCOLOGY | Facility: CLINIC | Age: 77
End: 2022-09-28
Payer: MEDICARE

## 2022-09-28 NOTE — TELEPHONE ENCOUNTER
Called pt, no answer, no VM.  Called Dr Tiwari' office and verified fax number. Faxed referral to Dr Rodriguez's office.  Received email confirmation that fax was received. ----- Message from Romy Badillo Patient Care Assistant sent at 9/28/2022  8:52 AM CDT -----  Type: Needs Medical Advice  Who Called:  jayy Spears Call Back Number: [phone]    Additional Information: patient is wanting to speak to nurse, to have her fax over referral to the plastic surgery ,  fax [phone]  please call to further discuss, thank  you

## 2022-09-29 LAB
FINAL PATHOLOGIC DIAGNOSIS: NORMAL
Lab: NORMAL

## 2022-10-04 NOTE — PROGRESS NOTES
RELIAPATH DIAGNOSIS:   A.   SKIN, LEFT DISTAL DORSAL FOREARM PROXIMAL, SHAVE BIOPSY:   - Invasive squamous cell carcinoma, moderately differentiated, transected at   the biopsy base.   - Tumor Size:  4.5 mm.   - Tumor Thickness:  At least 1.5 mm.   - Lymphovascular Invasion:  Not identified.   - Perineural Invasion:  Not identified.   B.   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   C.   SKIN, LEFT LATERAL NECK ANTERIOR, SHAVE BIOPSY:   - Solar lentigo.   - No atypia or malignancy identified.   D.   SKIN, LEFT LATERAL NECK POSTERIOR, SHAVE BIOPSY:   - Hypertrophic actinic keratosis.   E.   SKIN, LEFT SHOULDER, SHAVE BIOPSY:   - Seborrheic keratosis, focally inflamed.    Please call to discuss results / plan / schedule:   A, B - treated with EDC at time of bx, please schedule re-evaluation in 1-2 month.   C - benign   D - please schedule re-evaluation in 1-2 month.   E - benign  do not rub eye. tylenol for discomfort.  avoid advil motrin aleve aspirin to decrease chance of bleeding. eye kit given to pt.  Discharge and follow up  instructions explained to pt.  pt understands proper use of eye drops and instructions.

## 2022-10-05 ENCOUNTER — TELEPHONE (OUTPATIENT)
Dept: DERMATOLOGY | Facility: CLINIC | Age: 77
End: 2022-10-05
Payer: MEDICARE

## 2022-10-05 NOTE — PROGRESS NOTES
Correction to plan for (A) given depth, will have lesion evaluated for Mohs with Dr Moreno. Discussed updated patient with patient. Josh team copied to result note, pt expecting call to schedule consultation. Kiesha team please continue to call patient to schedule follow up with me to recheck (B), (D). Thank you all!

## 2022-10-11 ENCOUNTER — TELEPHONE (OUTPATIENT)
Dept: DERMATOLOGY | Facility: CLINIC | Age: 77
End: 2022-10-11
Payer: MEDICARE

## 2022-10-11 NOTE — TELEPHONE ENCOUNTER
----- Message from La Pop MD sent at 10/5/2022 10:20 AM CDT -----  Correction to plan for (A) given depth, will have lesion evaluated for Mohs with Dr Moreno. Discussed updated patient with patient. Josh team copied to result note, pt expecting call to schedule consultation. Kiesha team please continue to call patient to schedule follow up with me to recheck (B), (D). Thank you all!     A.   SKIN, LEFT DISTAL DORSAL FOREARM PROXIMAL, SHAVE BIOPSY:   - Invasive squamous cell carcinoma, moderately differentiated, transected at   the biopsy base.   - Tumor Size:  4.5 mm.   - Tumor Thickness:  At least 1.5 mm.   - Lymphovascular Invasion:  Not identified.   - Perineural Invasion:  Not identified.     B.   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     C.   SKIN, LEFT LATERAL NECK ANTERIOR, SHAVE BIOPSY:   - Solar lentigo.   - No atypia or malignancy identified.     D.   SKIN, LEFT LATERAL NECK POSTERIOR, SHAVE BIOPSY:   - Hypertrophic actinic keratosis.     E.   SKIN, LEFT SHOULDER, SHAVE BIOPSY:   - Seborrheic keratosis, focally inflamed

## 2022-10-11 NOTE — TELEPHONE ENCOUNTER
Tried to reach pt. No answer, I left a message on answering machine.    Contacting to inform pt of results and recommendations per La Pop MD.

## 2022-10-27 ENCOUNTER — TELEPHONE (OUTPATIENT)
Dept: DERMATOLOGY | Facility: CLINIC | Age: 77
End: 2022-10-27
Payer: MEDICARE

## 2022-11-01 ENCOUNTER — TELEPHONE (OUTPATIENT)
Dept: DERMATOLOGY | Facility: CLINIC | Age: 77
End: 2022-11-01
Payer: MEDICARE

## 2022-11-03 ENCOUNTER — TELEPHONE (OUTPATIENT)
Dept: DERMATOLOGY | Facility: CLINIC | Age: 77
End: 2022-11-03
Payer: MEDICARE

## 2022-11-03 NOTE — TELEPHONE ENCOUNTER
Tried to reach pt. No answer, will try again later and I left a message on answering machine.    Contacting to inform pt of results and recommendations per La Pop MD.

## 2022-11-03 NOTE — TELEPHONE ENCOUNTER
----- Message from La Pop MD sent at 10/5/2022 10:20 AM CDT -----  Correction to plan for (A) given depth, will have lesion evaluated for Mohs with Dr Moreno. Discussed updated patient with patient. Josh team copied to result note, pt expecting call to schedule consultation. Kiesha team please continue to call patient to schedule follow up with me to recheck (B), (D). Thank you all!

## 2022-11-08 ENCOUNTER — TELEPHONE (OUTPATIENT)
Dept: DERMATOLOGY | Facility: CLINIC | Age: 77
End: 2022-11-08
Payer: MEDICARE

## 2022-11-25 ENCOUNTER — TELEPHONE (OUTPATIENT)
Dept: DERMATOLOGY | Facility: CLINIC | Age: 77
End: 2022-11-25
Payer: MEDICARE

## 2022-11-29 ENCOUNTER — TELEPHONE (OUTPATIENT)
Dept: DERMATOLOGY | Facility: CLINIC | Age: 77
End: 2022-11-29
Payer: MEDICARE

## 2022-11-29 NOTE — TELEPHONE ENCOUNTER
Called patient to reschedule his appointment, he said that he hs problems out of town to take of, but he does not have transportation. He said he would call us back.

## 2022-12-27 ENCOUNTER — TELEPHONE (OUTPATIENT)
Dept: DERMATOLOGY | Facility: CLINIC | Age: 77
End: 2022-12-27
Payer: MEDICARE

## 2023-01-02 NOTE — PROGRESS NOTES
PWS team contacted patient for Mohs consult, and patient declined despite recommendation.   Message sent to my team to schedule f/u with me in Feb once I return from maternity leave re-evaluation.

## 2023-01-03 ENCOUNTER — TELEPHONE (OUTPATIENT)
Dept: DERMATOLOGY | Facility: CLINIC | Age: 78
End: 2023-01-03
Payer: MEDICARE

## 2023-01-03 NOTE — TELEPHONE ENCOUNTER
Tried to reach pt. No answer, will try again later and I left a message on answering machine.    Contacting to inform pt of openings in dept per La Pop MD request.

## 2023-01-03 NOTE — TELEPHONE ENCOUNTER
----- Message from La Pop MD sent at 1/2/2023  2:29 PM CST -----  Thanks Dr Moreno!  Kiesha team - please contact patient to schedule f/u with me for when I return in Feb / March. Document outreach / result in chart.   Thank you!     ----- Message -----  From: Sharif Moreno MD  Sent: 12/27/2022  12:54 PM CST  To: MD Ryanne Beth FYI, see Arsh's message below.   I haven't seen him about the squamous cell carcinoma on the left forearm you'd biopsied, which has maybe now cleared post biopsy.   You may want to get him back in for a routine followup in several months.   Sharif Raygoza  ----- Message -----  From: ST Shane  Sent: 12/27/2022  12:41 PM CST  To: MD Dr. BERNADINE Lee,  He said does not want to reschedule and he cannot see anything there anymore. He said would call back if he noticed anything.    Thanks        ----- Message -----  From: Sharif Moreno MD  Sent: 12/27/2022  12:34 PM CST  To: Sharif Moreno MD, ST Arsh Lynn,  See Madeline's message below. Mr. Fagan has a squamous cell carcinoma on the left forearm that Dr. Pop wanted him to see be about.   Madeline last spoke to him on 11/29.   I'd like you to call him again and see if he can schedule a consult now.   Thanks.     =============================  Previous Messages    Kandy Sánchez CST Yesterday (12:36 PM)   [place]   Called patient to reschedule his appointment, he said that he hs problems out of town to take of, but he does not have transportation. He said he would call us back.   Note   GUEVARA Plascencia Charles J Yesterday (12:34 PM)

## 2023-01-04 ENCOUNTER — TELEPHONE (OUTPATIENT)
Dept: DERMATOLOGY | Facility: CLINIC | Age: 78
End: 2023-01-04
Payer: MEDICARE

## 2023-01-04 NOTE — TELEPHONE ENCOUNTER
----- Message from Toya Mata LPN sent at 1/3/2023 12:09 PM CST -----  Tried to reach pt. No answer, will try again later and I left a message on answering machine.     Contacting to inform pt of openings in dept per La Pop MD request.  ----- Message -----  From: La Pop MD  Sent: 1/2/2023   2:33 PM CST  To: Sharif Moreno MD, ST Shane, #    Thanks Dr Moreno!  Kiesha team - please contact patient to schedule f/u with me for when I return in Feb / March. Document outreach / result in chart.   Thank you!     ----- Message -----  From: Sharif Moreno MD  Sent: 12/27/2022  12:54 PM CST  To: MD Ryanne Beth FYI, see Arsh's message below.   I haven't seen him about the squamous cell carcinoma on the left forearm you'd biopsied, which has maybe now cleared post biopsy.   You may want to get him back in for a routine followup in several months.   Thanks,   Sharif  ----- Message -----  From: ST Shane  Sent: 12/27/2022  12:41 PM CST  To: MD Dr. BERNADINE Lee,  He said does not want to reschedule and he cannot see anything there anymore. He said would call back if he noticed anything.    Thanks        ----- Message -----  From: Sharif Moreno MD  Sent: 12/27/2022  12:34 PM CST  To: Sharif Moreno MD, ST Arsh Lynn,  See Madeline's message below. Mr. Fagan has a squamous cell carcinoma on the left forearm that Dr. Pop wanted him to see be about.   Madeline last spoke to him on 11/29.   I'd like you to call him again and see if he can schedule a consult now.   Thanks.     =============================  Previous Messages    Kandy B Gonzalo, CST Yesterday (12:36 PM)   [place]   Called patient to reschedule his appointment, he said that he hs problems out of town to take of, but he does not have transportation. He said he would call us back.   Note   Kandy Sánchez, Sergio Che Yesterday (12:34 PM)

## 2023-03-22 ENCOUNTER — OFFICE VISIT (OUTPATIENT)
Dept: DERMATOLOGY | Facility: CLINIC | Age: 78
End: 2023-03-22
Payer: MEDICARE

## 2023-03-22 DIAGNOSIS — D18.01 CHERRY ANGIOMA: ICD-10-CM

## 2023-03-22 DIAGNOSIS — L82.1 SK (SEBORRHEIC KERATOSIS): ICD-10-CM

## 2023-03-22 DIAGNOSIS — L70.0 COMEDONE: ICD-10-CM

## 2023-03-22 DIAGNOSIS — Z85.828 HISTORY OF NONMELANOMA SKIN CANCER: ICD-10-CM

## 2023-03-22 DIAGNOSIS — L81.4 LENTIGINES: ICD-10-CM

## 2023-03-22 DIAGNOSIS — L57.8 ACTINIC SKIN DAMAGE: Primary | ICD-10-CM

## 2023-03-22 DIAGNOSIS — L57.0 AK (ACTINIC KERATOSIS): ICD-10-CM

## 2023-03-22 PROCEDURE — 17004 DESTROY PREMAL LESIONS 15/>: CPT | Mod: PBBFAC,PO | Performed by: DERMATOLOGY

## 2023-03-22 PROCEDURE — 99214 PR OFFICE/OUTPT VISIT, EST, LEVL IV, 30-39 MIN: ICD-10-PCS | Mod: 25,S$PBB,, | Performed by: DERMATOLOGY

## 2023-03-22 PROCEDURE — 99999 PR PBB SHADOW E&M-EST. PATIENT-LVL II: ICD-10-PCS | Mod: PBBFAC,,, | Performed by: DERMATOLOGY

## 2023-03-22 PROCEDURE — 99212 OFFICE O/P EST SF 10 MIN: CPT | Mod: PBBFAC,PO,25 | Performed by: DERMATOLOGY

## 2023-03-22 PROCEDURE — 99214 OFFICE O/P EST MOD 30 MIN: CPT | Mod: 25,S$PBB,, | Performed by: DERMATOLOGY

## 2023-03-22 PROCEDURE — 17004 PR DESTRUCTION, PREMALIGNANT LESIONS; 15 OR MORE LESIONS: ICD-10-PCS | Mod: S$PBB,,, | Performed by: DERMATOLOGY

## 2023-03-22 PROCEDURE — 17004 DESTROY PREMAL LESIONS 15/>: CPT | Mod: S$PBB,,, | Performed by: DERMATOLOGY

## 2023-03-22 PROCEDURE — 99999 PR PBB SHADOW E&M-EST. PATIENT-LVL II: CPT | Mod: PBBFAC,,, | Performed by: DERMATOLOGY

## 2023-03-22 NOTE — PROGRESS NOTES
Subjective:       Patient ID:  Sergio Fagan is a 77 y.o. male who presents for   No chief complaint on file.    HPI  Established patient.  Hx of NMSC as below.   LV 9/2022 - biopsies performed as below, lesions at L distal dorsal forearm (proximal) and L distal dorsal forearm (distal) both treated with EDC immediately following biopsy. Aks cryo'd.     +pacemaker     RELIAPATH DIAGNOSIS:   A.   SKIN, LEFT DISTAL DORSAL FOREARM PROXIMAL, SHAVE BIOPSY:   - Invasive squamous cell carcinoma, moderately differentiated, transected at   the biopsy base.   - Tumor Size:  4.5 mm.   - Tumor Thickness:  At least 1.5 mm.   - Lymphovascular Invasion:  Not identified.   - Perineural Invasion:  Not identified.   B.   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   C.   SKIN, LEFT LATERAL NECK ANTERIOR, SHAVE BIOPSY:   - Solar lentigo.   - No atypia or malignancy identified.   D.   SKIN, LEFT LATERAL NECK POSTERIOR, SHAVE BIOPSY:   - Hypertrophic actinic keratosis.   E.   SKIN, LEFT SHOULDER, SHAVE BIOPSY:   - Seborrheic keratosis, focally inflamed.     Derm hx:  SCC, mod differentiated, at L distal dorsal forearm (proximal) s/p EDC 9/2022  SCCIS at L distal dorsal forearm (distal) s/p EDC 9/2022  HAK w/ transition to SCCis - L neck s/p EDC 8/2022  SCCis R neck ED&C 8/12/22  SCC L dorsal hand - ed&C 8/12/22  SCC- R brow surgery w/ Hazel on 6/15/22  Scc- R clavicle   SCC x 2 on scalp- Mohs per PWS 9/13/19;  HAK with SCCIS on L neck 8/20/19 (no evidence of recurrence; no treatment)    Review of Systems   Constitutional:  Negative for fever and chills.   Skin:  Positive for dry skin, activity-related sunscreen use and wears hat.   Hematologic/Lymphatic: Bruises/bleeds easily (on ASA and Plavix).      Objective:    Physical Exam   Constitutional: He appears well-developed and well-nourished.   Neurological: He is alert and  oriented to person, place, and time.   Psychiatric: He has a normal mood and affect.   Skin:   Areas Examined (abnormalities noted in diagram):   Scalp / Hair Palpated and Inspected  Head / Face Inspection Performed  RUE Inspected  LUE Inspection Performed                     Diagram Legend     Erythematous scaling macule/papule c/w actinic keratosis       Vascular papule c/w angioma      Pigmented verrucoid papule/plaque c/w seborrheic keratosis      Yellow umbilicated papule c/w sebaceous hyperplasia      Irregularly shaped tan macule c/w lentigo     1-2 mm smooth white papules consistent with Milia      Movable subcutaneous cyst with punctum c/w epidermal inclusion cyst      Subcutaneous movable cyst c/w pilar cyst      Firm pink to brown papule c/w dermatofibroma      Pedunculated fleshy papule(s) c/w skin tag(s)      Evenly pigmented macule c/w junctional nevus     Mildly variegated pigmented, slightly irregular-bordered macule c/w mildly atypical nevus      Flesh colored to evenly pigmented papule c/w intradermal nevus       Pink pearly papule/plaque c/w basal cell carcinoma      Erythematous hyperkeratotic cursted plaque c/w SCC      Surgical scar with no sign of skin cancer recurrence      Open and closed comedones      Inflammatory papules and pustules      Verrucoid papule consistent consistent with wart     Erythematous eczematous patches and plaques     Dystrophic onycholytic nail with subungual debris c/w onychomycosis     Umbilicated papule    Erythematous-base heme-crusted tan verrucoid plaque consistent with inflamed seborrheic keratosis     Erythematous Silvery Scaling Plaque c/w Psoriasis     See annotation      Assessment / Plan:        AK (actinic keratosis)  - Discussed diagnosis, etiology, and precancerous nature of condition.   - Cryosurgery Procedure Note: Discussed procedure with patient/patient's guardian including risks and benefits as well as treatment alternatives. Risks of procedure  include pain, itching, swelling, redness, blistering, crusting, wound formation, post-inflammatory pigmentary alteration, scar, recurrence. Verbal consent obtained. LN2 cryosurgery performed to 15+ lesion(s). Patient tolerated procedure well. After-visit wound care instructions reviewed and provided in writing.     Actinic skin damage  Recommended course Efudex BID x 2-4 weeks at scalp. Pt already with rx.   - Counseled on potential SE of medication(s) and instructed on use.   - Plan for lateral neck, arms as future sites of field therapy.    Lentigines  - Benign; reassured treatment not necessary.   - Recommended daily sun protection, including the use of OTC broad-spectrum sunscreen (SPF 30 or greater) and sun-protective clothing.     - LN2 offered to residual bx proven lentigo at L lateral neck (anterior).     SK (seborrheic keratosis)  [place]  Cherry angioma  - Benign; reassured treatment not necessary.      History of nonmelanoma skin cancer  - Sites of prior malignancy examined - no concern for recurrence today.             Follow up in about 3 months (around 6/22/2023).

## 2023-03-22 NOTE — PATIENT INSTRUCTIONS
Field Treatment for Actinic Keratoses (precancerous lesions)    5-Fluorouracil - This is a topical chemotherapy for your skin. This cream should never be applied without discussing with you dermatologist.    This treatment gets your immune system involved in fighting precancers (actinic keratoses), even those we can't yet see.     HOW TO APPLY: Apply a fingertip length amount to the entire area (scalp) 2x/day for 2 weeks. Wash your hands carefully after applying the cream and wipe glasses, BIPAP/CPAP machines, or anything else that comes in frequent contact with the cream.    WHAT TO EXPECT: Your skin will likely become red, crusted, sore, and tender during the treatment usually starting around day 3 and continuing for about 1 week after last application. Your skin may take up to 6 weeks to return to its normal coloring (lose the pink).     HOW TO HEAL FAST: To speed healing, wash with a gentle wash and apply Vaseline jelly especially to crusted open areas.    WE CAN HELP: Please contact us for any questions or problem shooting the application of these creams: [phone] or [email or web address]    IT WILL BE WORTH IT!!!

## 2023-06-06 ENCOUNTER — PATIENT MESSAGE (OUTPATIENT)
Dept: DERMATOLOGY | Facility: CLINIC | Age: 78
End: 2023-06-06
Payer: MEDICARE

## 2023-07-14 ENCOUNTER — TELEPHONE (OUTPATIENT)
Dept: DERMATOLOGY | Facility: CLINIC | Age: 78
End: 2023-07-14
Payer: MEDICARE

## 2023-07-14 NOTE — TELEPHONE ENCOUNTER
----- Message from Clayton May sent at 7/14/2023  7:35 AM CDT -----  Contact: pt at [phone]  Type:  Sooner Appointment Request    Caller is requesting a sooner appointment.  Caller declined first available appointment listed below.  Caller will not accept being placed on the waitlist and is requesting a message be sent to doctor.    Name of Caller:  pt  When is the first available appointment?  N/A  Best Call Back Number:  [phone]  Additional Information:  pt is calling the office to reschedule his appt for this morning at 10 am and he would like to reschedule for the next opening in the morning he prefers morning apps.

## 2023-08-07 ENCOUNTER — OFFICE VISIT (OUTPATIENT)
Dept: DERMATOLOGY | Facility: CLINIC | Age: 78
End: 2023-08-07
Payer: MEDICARE

## 2023-08-07 DIAGNOSIS — L82.0 INFLAMED SEBORRHEIC KERATOSIS: ICD-10-CM

## 2023-08-07 DIAGNOSIS — L57.0 AK (ACTINIC KERATOSIS): ICD-10-CM

## 2023-08-07 DIAGNOSIS — D22.9 MULTIPLE BENIGN NEVI: ICD-10-CM

## 2023-08-07 DIAGNOSIS — Z12.83 SCREENING FOR SKIN CANCER: ICD-10-CM

## 2023-08-07 DIAGNOSIS — D18.01 CHERRY ANGIOMA: ICD-10-CM

## 2023-08-07 DIAGNOSIS — Z85.828 HISTORY OF NONMELANOMA SKIN CANCER: ICD-10-CM

## 2023-08-07 DIAGNOSIS — L81.4 LENTIGINES: ICD-10-CM

## 2023-08-07 DIAGNOSIS — L82.1 SK (SEBORRHEIC KERATOSIS): ICD-10-CM

## 2023-08-07 DIAGNOSIS — R23.8 VENOUS LAKE: ICD-10-CM

## 2023-08-07 DIAGNOSIS — L72.0 MILIA: ICD-10-CM

## 2023-08-07 DIAGNOSIS — L57.8 ACTINIC SKIN DAMAGE: Primary | ICD-10-CM

## 2023-08-07 PROCEDURE — 17110 DESTRUCTION B9 LES UP TO 14: CPT | Mod: S$PBB,XS,, | Performed by: DERMATOLOGY

## 2023-08-07 PROCEDURE — 17004 DESTROY PREMAL LESIONS 15/>: CPT | Mod: S$PBB,,, | Performed by: DERMATOLOGY

## 2023-08-07 PROCEDURE — 99212 OFFICE O/P EST SF 10 MIN: CPT | Mod: PBBFAC,PO,25 | Performed by: DERMATOLOGY

## 2023-08-07 PROCEDURE — 17004 PR DESTRUCTION, PREMALIGNANT LESIONS; 15 OR MORE LESIONS: ICD-10-PCS | Mod: S$PBB,,, | Performed by: DERMATOLOGY

## 2023-08-07 PROCEDURE — 99214 OFFICE O/P EST MOD 30 MIN: CPT | Mod: 25,S$PBB,, | Performed by: DERMATOLOGY

## 2023-08-07 PROCEDURE — 99999 PR PBB SHADOW E&M-EST. PATIENT-LVL II: ICD-10-PCS | Mod: PBBFAC,,, | Performed by: DERMATOLOGY

## 2023-08-07 PROCEDURE — 99214 PR OFFICE/OUTPT VISIT, EST, LEVL IV, 30-39 MIN: ICD-10-PCS | Mod: 25,S$PBB,, | Performed by: DERMATOLOGY

## 2023-08-07 PROCEDURE — 17004 DESTROY PREMAL LESIONS 15/>: CPT | Mod: PBBFAC,PO | Performed by: DERMATOLOGY

## 2023-08-07 PROCEDURE — 99999 PR PBB SHADOW E&M-EST. PATIENT-LVL II: CPT | Mod: PBBFAC,,, | Performed by: DERMATOLOGY

## 2023-08-07 PROCEDURE — 17110 PR DESTRUCTION BENIGN LESIONS UP TO 14: ICD-10-PCS | Mod: S$PBB,XS,, | Performed by: DERMATOLOGY

## 2023-08-07 PROCEDURE — 17110 DESTRUCTION B9 LES UP TO 14: CPT | Mod: PBBFAC,XS,PO | Performed by: DERMATOLOGY

## 2023-08-07 NOTE — PATIENT INSTRUCTIONS
Field Treatment for Actinic Keratoses (precancerous lesions)    5-Fluorouracil - This is a topical chemotherapy for your skin. This cream should never be applied without discussing with you dermatologist.    This treatment gets your immune system involved in fighting precancers (actinic keratoses), even those we can't yet see.     HOW TO APPLY: Apply a fingertip length amount to the entire area (bald scalp, temples, rims of ears) 2x/day for 2-4 weeks. Wash your hands carefully after applying the cream and wipe glasses, BIPAP/CPAP machines, or anything else that comes in frequent contact with the cream.    WHAT TO EXPECT: Your skin will likely become red, crusted, sore, and tender during the treatment usually starting around day 3 and continuing for about 1 week after last application. Your skin may take up to 6 weeks to return to its normal coloring (lose the pink).     HOW TO HEAL FAST: To speed healing, wash with a gentle wash and apply Vaseline jelly especially to crusted open areas.    WE CAN HELP: Please contact us for any questions or problem shooting the application of these creams: [phone] or [email or web address]    IT WILL BE WORTH IT!!!     CRYOSURGERY      Your doctor has used a method called cryosurgery to treat your skin condition. Cryosurgery refers to the use of very cold substances to treat a variety of skin conditions such as warts, pre-skin cancers, molluscum contagiosum, sun spots, and several benign growths. The substance we use in cryosurgery is liquid nitrogen and is so cold (-195 degrees Celsius) that is burns when administered.     Following treatment in the office, the skin may immediately burn and become red. You may find the area around the lesion is affected as well. It is sometimes necessary to treat not only the lesion, but a small area of the surrounding normal skin to achieve a good response.     A blister, and even a blood filled blister, may form after treatment.   This is a  normal response. If the blister is painful, it is acceptable to sterilize a needle and with rubbing alcohol and gently pop the blister. It is important that you gently wash the area with soap and warm water as the blister fluid may contain wart virus if a wart was treated. Do no remove the roof of the blister.     The area treated can take anywhere from 1-3 weeks to heal. Healing time depends on the kind skin lesion treated, the location, and how aggressively the lesion was treated. It is recommended that the areas treated are covered with Vaseline or bacitracin ointment and a band-aid. If a band-aid is not practical, just ointment applied several times per day will do. Keeping these areas moist will speed the healing time.    Treatment with liquid nitrogen can leave a scar. In dark skin, it may be a light or dark scar, in light skin it may be a white or pink scar. These will generally fade with time, but may never go away completely.     If you have any concerns after your treatment, please feel free to call the office.       [facility] [place] [address]/ [phone] FAX/ [email or web address] What Are the Symptoms of Skin Cancer?  A change in your skin is the most common sign of skin cancer. This could be a new growth, a sore that doesnt heal, or a change in a mole. Not all skin cancers look the same.    For melanoma specifically, a simple way to remember the warning signs is to remember the A-B-C-D-Es of melanoma--    A stands for asymmetrical. Does the mole or spot have an irregular shape with two parts that look very different?  B stands for border. Is the border irregular or jagged?  C is for color. Is the color uneven?  D is for diameter. Is the mole or spot larger than the size of a pea?  E is for evolving. Has the mole or spot changed during the past few weeks or months?    Talk to your doctor if you notice changes in your skin such as a new growth, a sore that doesnt  heal, a change in an old growth, or any of the A-B-C-D-Es of melanoma    What Can I Do to Reduce My Risk of Skin Cancer?  Protection from ultraviolet (UV) radiation is important all year, not just during the summer or at the beach. UV rays from the sun can reach you on cloudy and hazy days, not just on bright and bridget days. UV rays also reflect off of surfaces like water, cement, sand, and snow. Indoor tanning (using a tanning bed, johnson, or sunlamp to get tan) exposes users to UV radiation.    The hours between 10 a.m. and 4 p.m. Daylight Saving Time (9 a.m. to 3 p.m. standard time) are the most hazardous for UV exposure outdoors in the continental [place]. UV rays from sunlight are the greatest during the late spring and early summer in [place].    CDC recommends easy options for protection from UV radiation--    Stay in the shade or indoors, especially during midday hours.  Wear clothing that covers your arms and legs.  Wear a hat with a wide brim to shade your face, head, ears, and neck.  Wear sunglasses that wrap around and block both UVA and UVB rays.  Use sunscreen with a sun protection factor (SPF) of 30 or higher, and both UVA and UVB (broad spectrum) protection.  Avoid indoor tanning.    Adapted from https://www.cdc.gov/cancer/skin/basic_info/

## 2023-08-07 NOTE — PROGRESS NOTES
Subjective:       Patient ID:  Sergio Fagan is a 77 y.o. male who presents for   No chief complaint on file.    HPI  Established patient.  Hx of NMSC as below.   LV 3/2023 - multiple Aks treated, instructed to perform Efudex field therapy to scalp, otherwise only benign lesions noted.   Patient reports using Efudex to scalp once daily x 1 week - pt notes some tenderness.     +pacemaker      Derm hx:  SCC, mod differentiated, at L distal dorsal forearm (proximal) s/p EDC 9/2022  SCCIS at L distal dorsal forearm (distal) s/p EDC 9/2022  HAK w/ transition to SCCis at L neck s/p EDC 8/2022  SCCIS at R neck s/p EDC 8/2022  SCC at L dorsal hand s/p EDC 8/2022  SCC at R brow s/p excision (Hazel) 6/2022  SCC at R clavicle   SCC x 2 at  scalp s/p Mohs (PWS) 9/2019;  HAK with SCCIS on L neck s/p shave bx 8/2019      Review of Systems   Constitutional:  Negative for fever and chills.   Skin:  Positive for dry skin, activity-related sunscreen use and wears hat.   Hematologic/Lymphatic: Bruises/bleeds easily (on ASA and Plavix).        Objective:    Physical Exam   Constitutional: He appears well-developed and well-nourished.   Neurological: He is alert and oriented to person, place, and time.   Psychiatric: He has a normal mood and affect.   Skin:   Areas Examined (abnormalities noted in diagram):   Scalp / Hair Palpated and Inspected  Head / Face Inspection Performed  Neck Inspection Performed  Chest / Axilla Inspection Performed  Abdomen Inspection Performed  Back Inspection Performed  RUE Inspected  LUE Inspection Performed                       Diagram Legend     Erythematous scaling macule/papule c/w actinic keratosis       Vascular papule c/w angioma      Pigmented verrucoid papule/plaque c/w seborrheic keratosis      Yellow umbilicated papule c/w sebaceous hyperplasia      Irregularly shaped tan macule c/w lentigo     1-2 mm smooth white papules consistent with Milia      Movable subcutaneous cyst with punctum c/w  epidermal inclusion cyst      Subcutaneous movable cyst c/w pilar cyst      Firm pink to brown papule c/w dermatofibroma      Pedunculated fleshy papule(s) c/w skin tag(s)      Evenly pigmented macule c/w junctional nevus     Mildly variegated pigmented, slightly irregular-bordered macule c/w mildly atypical nevus      Flesh colored to evenly pigmented papule c/w intradermal nevus       Pink pearly papule/plaque c/w basal cell carcinoma      Erythematous hyperkeratotic cursted plaque c/w SCC      Surgical scar with no sign of skin cancer recurrence      Open and closed comedones      Inflammatory papules and pustules      Verrucoid papule consistent consistent with wart     Erythematous eczematous patches and plaques     Dystrophic onycholytic nail with subungual debris c/w onychomycosis     Umbilicated papule    Erythematous-base heme-crusted tan verrucoid plaque consistent with inflamed seborrheic keratosis     Erythematous Silvery Scaling Plaque c/w Psoriasis     See annotation      Assessment / Plan:        AK (actinic keratosis)  - Discussed diagnosis, etiology, and precancerous nature of condition.   - Cryosurgery Procedure Note: Discussed procedure with patient/patient's guardian including risks and benefits as well as treatment alternatives. Risks of procedure include pain, itching, swelling, redness, blistering, crusting, wound formation, post-inflammatory pigmentary alteration, scar, recurrence. Verbal consent obtained. LN2 cryosurgery performed to 15+ lesion(s). Patient tolerated procedure well. After-visit wound care instructions reviewed and provided in writing.      Actinic skin damage  Repeat Efudex field therapy to scalp, also rec'd temples and rims of ears - recommended BID x 2-4 weeks as tolerated.   - Counseled on potential SE of medication(s) and instructed on use.     Inflamed seborrheic keratosis  - Discussed diagnosis, etiology, and treatment options.   - Cryosurgery Procedure Note: Discussed  procedure with patient/patient's guardian including risks and benefits as well as treatment alternatives. Risks of procedure include pain, itching, swelling, redness, blistering, crusting, wound formation, post-inflammatory pigmentary alteration, scar, recurrence. Verbal consent obtained. LN2 cryosurgery performed to 3 lesion(s). Patient tolerated procedure well. After-visit wound care instructions reviewed and provided in writing.     Multiple benign nevi  - Discussed diagnosis, etiology, and benign-nature of condition.  - Reassured; no lesions suspicious for malignancy noted on exam today.   - Recommended routine self examination of skin. Discussed the ABCDEs of melanoma and ugly duckling sign.   - Recommended daily sun protection, including the use of OTC broad-spectrum sunscreen (SPF 30 or greater) and sun-protective clothing.      Lentigines  - Benign; reassured treatment not necessary.   - Recommended daily sun protection, including the use of OTC broad-spectrum sunscreen (SPF 30 or greater) and sun-protective clothing.       SK (seborrheic keratosis)  Cherry angioma  Venous lake  Milia  - Benign; reassured treatment not necessary.      History of nonmelanoma skin cancer  Screening for skin cancer  - Upper body skin examination performed today.  - Findings listed above.   - Sites of prior malignancy examined - no concern for recurrence today.    - Recommended routine self examination of skin.    - Recommended daily sun protection, including the use of OTC broad-spectrum sunscreen (SPF 30 or greater) and sun-protective clothing.                Follow up in about 3 months (around 11/7/2023).

## 2024-01-10 PROBLEM — M54.50 RIGHT-SIDED LOW BACK PAIN WITHOUT SCIATICA: Status: ACTIVE | Noted: 2024-01-10

## 2024-05-01 NOTE — PROGRESS NOTES
Subjective:    Patient ID:  Sergio Fagan III is a 72 y.o. male who presents for follow-up of Hyperlipidemia (follow up); Hypertension; Coronary Artery Disease; and Aortic Stenosis      Pt last seen here 2 years ago. He has CAD and moderate AS; last echo 03/2016. Mr. Fagan reports since last visit he has been doing well with no new problems or new symptoms. He denies any chest pain, SOB, PND, orthopnea. He denies any palpitations, dizziness, syncope or pre-syncope. He denies rapid or sustained rhythms. He denies claudication, lower extremity edema. He denies exertional symptoms.         Review of Systems   Constitution: Negative for weight gain and weight loss.   HENT: Negative.    Eyes: Negative.    Cardiovascular: Negative for chest pain, claudication, cyanosis, dyspnea on exertion, irregular heartbeat, leg swelling, near-syncope, orthopnea (no PND), palpitations and syncope.   Respiratory: Negative for cough, hemoptysis, shortness of breath and snoring.    Endocrine: Negative.    Skin: Negative.    Musculoskeletal: Negative for joint pain, muscle cramps, muscle weakness and myalgias.   Gastrointestinal: Negative for diarrhea, hematemesis, nausea and vomiting.   Genitourinary: Negative.    Neurological: Negative for dizziness, focal weakness, light-headedness, loss of balance, numbness, paresthesias and seizures.   Psychiatric/Behavioral: Negative.         Objective:    Physical Exam   Constitutional: He is oriented to person, place, and time. He appears well-developed and well-nourished.   HENT:   Mouth/Throat: Oropharynx is clear and moist.   Eyes: Pupils are equal, round, and reactive to light.   Neck: Normal range of motion. No thyromegaly present.   Cardiovascular: Normal rate, regular rhythm, S1 normal, S2 normal, intact distal pulses and normal pulses.   No extrasystoles are present. PMI is not displaced.  Exam reveals no friction rub.    Murmur heard.  High-pitched harsh midsystolic murmur is present  with a grade of 2/6  at the upper right sternal border radiating to the neck  Pulmonary/Chest: Effort normal and breath sounds normal. He has no wheezes. He has no rales. He exhibits no tenderness.   Abdominal: Soft. Bowel sounds are normal. He exhibits no distension and no mass. There is no tenderness.   Musculoskeletal: Normal range of motion. He exhibits no edema.   Neurological: He is alert and oriented to person, place, and time.   Skin: Skin is warm and dry.   Vitals reviewed.      Test(s) Reviewed  I have reviewed the following in detail:  [] Stress test   [] Angiography   [x] Echocardiogram   [x] Labs   [] Other:         Assessment:       1. Aortic stenosis - LINDA 1.09 cm2    2. Coronary artery disease involving native coronary artery of native heart without angina pectoris    3. 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 Hyperlipidemia with target low density lipoprotein (LDL) cholesterol less than 70 mg/dL         Plan:       We again discussed the progressive nature of aortic stenosis and the symptoms asssociated, ie. chest pain, syncope/pre-syncope and worsening dyspnea.   We also discussed the need for closer f/u, both with visits and f/u echo's.  He remains asymptomatic  Continue present Rx  Echo  F/u 6 months   182

## 2024-08-08 NOTE — TELEPHONE ENCOUNTER
----- Message from Felicitas Cowart sent at 6/6/2022  8:53 AM CDT -----  Type: Needs Medical Advice  Who Called:  Joann with Pre Admit Testing   Symptoms (please be specific):    How long has patient had these symptoms:    Pharmacy name and phone #:    Best Call Back Number:   Additional Information: Ms. David called to inform Dr. Oliva that the patient did not show up for his appt. on 6/6. Ms David stated she tried to call but voicemail stated he don't listen to voice mails please send a text.        Please discontinue vyvanse and we will try adderall ER 10mg qd instead -  I sent this to pharmacy     Even if her insurance will not cover this, she should be able to get inexpensively with good rx so we do not delay treatment any longer due to insurance authorization  Please advise her of good rx option  (vyvanse still expensive with good rx)     This is low dose of adderall, so we can increase after one month if needed

## 2024-09-05 ENCOUNTER — TELEPHONE (OUTPATIENT)
Dept: NEUROLOGY | Facility: CLINIC | Age: 79
End: 2024-09-05
Payer: OTHER GOVERNMENT

## 2024-09-05 NOTE — TELEPHONE ENCOUNTER
Spoke with the pt, advised that he will be placed on the call back list for when [facility] opens 2025. Pt v/u

## 2024-09-05 NOTE — TELEPHONE ENCOUNTER
----- Message from Ailyn Funes sent at 9/5/2024 12:08 PM CDT -----  Regarding: Appointment Request  Contact: patient at [phone]  Type:  Appointment Request    Name of Caller:  patient at [phone]    When is the first available appointment?  none  Symptoms:  memory disorder, parkinsons  Additional Information:  would like to est. care with isis bocanegra on [facility]. Please call and advise. Thank you

## 2024-10-03 ENCOUNTER — TELEPHONE (OUTPATIENT)
Dept: NEUROLOGY | Facility: CLINIC | Age: 79
End: 2024-10-03
Payer: OTHER GOVERNMENT

## 2024-10-03 NOTE — TELEPHONE ENCOUNTER
Spoke with patient and scheduled new patient appointment with Margie Payne for Parkinson's on 4/14/24.

## 2024-10-03 NOTE — TELEPHONE ENCOUNTER
----- Message from Nurse Dupont sent at 9/13/2024  2:13 PM CDT -----  Regarding: FW: Margie Feb or March PD and memory    ----- Message -----  From: Kerri Mckeon RN  Sent: 9/5/2024  12:32 PM CDT  To: Kiah Yu LPN; Kerri Mckeon RN  Subject: Colleen Feb or March PD and memory

## 2024-10-30 ENCOUNTER — OFFICE VISIT (OUTPATIENT)
Facility: CLINIC | Age: 79
End: 2024-10-30
Payer: MEDICARE

## 2024-10-30 DIAGNOSIS — L57.8 ACTINIC SKIN DAMAGE: Primary | ICD-10-CM

## 2024-10-30 DIAGNOSIS — L82.0 INFLAMED SEBORRHEIC KERATOSIS: ICD-10-CM

## 2024-10-30 DIAGNOSIS — L82.1 SK (SEBORRHEIC KERATOSIS): ICD-10-CM

## 2024-10-30 DIAGNOSIS — Z12.83 SCREENING FOR SKIN CANCER: ICD-10-CM

## 2024-10-30 DIAGNOSIS — L57.0 AK (ACTINIC KERATOSIS): ICD-10-CM

## 2024-10-30 DIAGNOSIS — Z85.828 HISTORY OF NONMELANOMA SKIN CANCER: ICD-10-CM

## 2024-10-30 DIAGNOSIS — D48.5 NEOPLASM OF UNCERTAIN BEHAVIOR OF SKIN: ICD-10-CM

## 2024-10-30 PROCEDURE — 99212 OFFICE O/P EST SF 10 MIN: CPT | Mod: PBBFAC,PN,25 | Performed by: DERMATOLOGY

## 2024-10-30 PROCEDURE — 99999 PR PBB SHADOW E&M-EST. PATIENT-LVL II: CPT | Mod: PBBFAC,,, | Performed by: DERMATOLOGY

## 2024-10-30 PROCEDURE — 17004 DESTROY PREMAL LESIONS 15/>: CPT | Mod: S$PBB,,, | Performed by: DERMATOLOGY

## 2024-10-30 PROCEDURE — 11103 TANGNTL BX SKIN EA SEP/ADDL: CPT | Mod: PBBFAC,XS,PN | Performed by: DERMATOLOGY

## 2024-10-30 PROCEDURE — 17110 DESTRUCTION B9 LES UP TO 14: CPT | Mod: 59,PBBFAC,PN | Performed by: DERMATOLOGY

## 2024-10-30 PROCEDURE — 17004 DESTROY PREMAL LESIONS 15/>: CPT | Mod: PBBFAC,PN | Performed by: DERMATOLOGY

## 2024-10-30 PROCEDURE — 99213 OFFICE O/P EST LOW 20 MIN: CPT | Mod: 25,S$PBB,, | Performed by: DERMATOLOGY

## 2024-10-30 PROCEDURE — 11102 TANGNTL BX SKIN SINGLE LES: CPT | Mod: 59,PBBFAC,PN | Performed by: DERMATOLOGY

## 2024-10-30 PROCEDURE — 11102 TANGNTL BX SKIN SINGLE LES: CPT | Mod: S$PBB,XS,, | Performed by: DERMATOLOGY

## 2024-10-30 PROCEDURE — 11103 TANGNTL BX SKIN EA SEP/ADDL: CPT | Mod: S$PBB,XS,, | Performed by: DERMATOLOGY

## 2024-10-30 PROCEDURE — 17110 DESTRUCTION B9 LES UP TO 14: CPT | Mod: S$PBB,XS,, | Performed by: DERMATOLOGY

## 2024-10-31 ENCOUNTER — TELEPHONE (OUTPATIENT)
Facility: CLINIC | Age: 79
End: 2024-10-31
Payer: OTHER GOVERNMENT

## 2024-11-11 ENCOUNTER — TELEPHONE (OUTPATIENT)
Dept: DERMATOLOGY | Facility: CLINIC | Age: 79
End: 2024-11-11
Payer: OTHER GOVERNMENT

## 2024-11-12 DIAGNOSIS — C44.41 BCC (BASAL CELL CARCINOMA), SCALP/NECK: Primary | ICD-10-CM

## 2024-11-20 ENCOUNTER — OFFICE VISIT (OUTPATIENT)
Facility: CLINIC | Age: 79
End: 2024-11-20
Payer: MEDICARE

## 2024-11-20 DIAGNOSIS — L57.8 ACTINIC SKIN DAMAGE: ICD-10-CM

## 2024-11-20 DIAGNOSIS — D48.5 NEOPLASM OF UNCERTAIN BEHAVIOR OF SKIN: ICD-10-CM

## 2024-11-20 DIAGNOSIS — L57.0 AK (ACTINIC KERATOSIS): ICD-10-CM

## 2024-11-20 DIAGNOSIS — L82.0 INFLAMED SEBORRHEIC KERATOSIS: ICD-10-CM

## 2024-11-20 DIAGNOSIS — D09.9 SQUAMOUS CELL CARCINOMA IN SITU: Primary | ICD-10-CM

## 2024-11-20 PROCEDURE — 17000 DESTRUCT PREMALG LESION: CPT | Mod: PBBFAC,PN | Performed by: DERMATOLOGY

## 2024-11-20 PROCEDURE — 99212 OFFICE O/P EST SF 10 MIN: CPT | Mod: PBBFAC,PN | Performed by: DERMATOLOGY

## 2024-11-20 PROCEDURE — 17003 DESTRUCT PREMALG LES 2-14: CPT | Mod: PBBFAC,PN | Performed by: DERMATOLOGY

## 2024-11-20 PROCEDURE — 99999 PR PBB SHADOW E&M-EST. PATIENT-LVL II: CPT | Mod: PBBFAC,,, | Performed by: DERMATOLOGY

## 2024-11-20 PROCEDURE — 17110 DESTRUCTION B9 LES UP TO 14: CPT | Mod: 59,PBBFAC,PN | Performed by: DERMATOLOGY

## 2024-11-20 PROCEDURE — 11102 TANGNTL BX SKIN SINGLE LES: CPT | Mod: 59,PBBFAC,PN | Performed by: DERMATOLOGY

## 2024-11-20 RX ORDER — FLUOROURACIL 50 MG/G
CREAM TOPICAL
Qty: 40 G | Refills: 1 | Status: SHIPPED | OUTPATIENT
Start: 2024-11-20

## 2024-11-20 NOTE — PROGRESS NOTES
Subjective:       Patient ID:  Sergio Fagan is a 78 y.o. male who presents for   Chief Complaint   Patient presents with    Follow-up     Path review      HPI  Established patient.  +NMSC,AK.   Here for path review following LV limited skin check 10/30/24.   Scheduled for Mohs with Sk for BCC at L parietal scalp. Interested in PDT following Mohs at scalp.     10/30/24  1. Skin, left parietal scalp, shave biopsy:   - BASAL CELL CARCINOMA.   - THE TUMOR EXTENDS TO THE DEEP AND LATERAL BIOPSY MARGINS.   2. Skin, left base of neck, shave biopsy:   - SQUAMOUS CELL CARCINOMA IN SITU, AT LEAST.   - THE ATYPICAL SQUAMOUS EPITHELIUM EXTENDS TO THE BASE OF THE BIOPSY, AND AN UNDERLYING INVASIVE SQUAMOUS CELL CARCINOMA CANNOT BE EXCLUDED.   - INCIDENTAL ADJACENT SOLAR LENTIGO.    3. Skin, left posterior shoulder, shave biopsy:   - SQUAMOUS CELL CARCINOMA IN SITU.   - THE TUMOR EXTENDS TO A LATERAL BIOPSY MARGIN.     +pacemaker      +NMSC  SCCIS at L posterior shoulder, pending treatment  SCCIS at least at L base of neck, pending treatment  BCC at L parietal scalp, scheduled for Mohs 12/5/24 (SK)  SCC, mod differentiated, at L distal dorsal forearm (proximal) s/p EDC 9/2022  SCCIS at L distal dorsal forearm (distal) s/p EDC 9/2022  HAK w/ transition to SCCis at L neck s/p EDC 8/2022  SCCIS at R neck s/p EDC 8/2022  SCC at L dorsal hand s/p EDC 8/2022  SCC at R brow s/p excision (Hazel) 6/2022  SCC at R clavicle   SCC x 2 at  scalp s/p Mohs (PWS) 9/2019;  HAK with SCCIS on L neck s/p shave bx 8/2019      +AK  Cryotherapy, Efudex field therapy (scalp 2023)    Of note, hx of Parkinson disease.     Review of Systems   Constitutional:  Negative for fever and chills.   Skin:  Positive for dry skin, activity-related sunscreen use and wears hat.   Hematologic/Lymphatic: Bruises/bleeds easily (on ASA and Plavix).        Objective:    Physical Exam   Constitutional: He appears well-developed and well-nourished.   Neurological: He is  alert and oriented to person, place, and time.   Psychiatric: He has a normal mood and affect.   Skin:   Areas Examined (abnormalities noted in diagram):   Scalp / Hair Palpated and Inspected  Head / Face Inspection Performed  Neck Inspection Performed                   Diagram Legend     Erythematous scaling macule/papule c/w actinic keratosis       Vascular papule c/w angioma      Pigmented verrucoid papule/plaque c/w seborrheic keratosis      Yellow umbilicated papule c/w sebaceous hyperplasia      Irregularly shaped tan macule c/w lentigo     1-2 mm smooth white papules consistent with Milia      Movable subcutaneous cyst with punctum c/w epidermal inclusion cyst      Subcutaneous movable cyst c/w pilar cyst      Firm pink to brown papule c/w dermatofibroma      Pedunculated fleshy papule(s) c/w skin tag(s)      Evenly pigmented macule c/w junctional nevus     Mildly variegated pigmented, slightly irregular-bordered macule c/w mildly atypical nevus      Flesh colored to evenly pigmented papule c/w intradermal nevus       Pink pearly papule/plaque c/w basal cell carcinoma      Erythematous hyperkeratotic cursted plaque c/w SCC      Surgical scar with no sign of skin cancer recurrence      Open and closed comedones      Inflammatory papules and pustules      Verrucoid papule consistent consistent with wart     Erythematous eczematous patches and plaques     Dystrophic onycholytic nail with subungual debris c/w onychomycosis     Umbilicated papule    Erythematous-base heme-crusted tan verrucoid plaque consistent with inflamed seborrheic keratosis     Erythematous Silvery Scaling Plaque c/w Psoriasis     See annotation      Assessment / Plan:      Pathology Orders:       Normal Orders This Visit    Specimen to Pathology, Dermatology     Comments:    Number of Specimens:->1  ------------------------->-------------------------  Spec 1 Procedure:->Biopsy  Spec 1 Clinical Impression:->bx proven scc is at least,  r/o  invasive scc  Spec 1 Source:->left base of neck  Release to patient->Immediate    Questions:    Procedure Type: Dermatology and skin neoplasms    Number of Specimens: 1    ------------------------: -------------------------    Spec 1 Procedure: Biopsy    Spec 1 Clinical Impression: bx proven scc is at least, r/o invasive scc    Spec 1 Source: left base of neck    Release to patient: Immediate          Neoplasm of uncertain behavior of skin  -     Specimen to Pathology, Dermatology  - Discussed diagnosis with patient and explained uncertain nature of condition, including differential DDX.   - Discussed treatment options (biopsy, close monitoring) with patient, including the risks and benefits of each. Patient opted to pursue biopsy.  - Shave Biopsy Procedure Note: Discussed procedure with patient/patient's guardian including risks and benefits as well as treatment alternatives. Risks of procedure include pain, bleeding, infection, post-inflammatory pigmentary alteration, scar, recurrence. Patient informed that the purpose of a biopsy is sampling of condition in question rather than removal in entirety; further treatment may be necessary. Verbal consent obtained. Area to be biopsied marked and cleansed with alcohol. Local anesthesia achieved by injecting approximately 1 cc of 1% lidocaine with epinephrine. One shave biopsy performed using a double edge razor blade; specimen submitted to pathology. Hemostasis achieved with aluminum chloride. Petroleum jelly and bandage applied to wound. Patient tolerated procedure well. After-visit wound care instructions reviewed and provided in writing.     Squamous cell carcinoma in situ  -     fluorouraciL (EFUDEX) 5 % cream; AAA bid x 6 weeks  Dispense: 40 g; Refill: 1  Efudex cream BID x 6 weeks   - Counseled on potential SE of medication(s) and instructed on use.     BCC of scalp  Pending mohs with sk 12/5    Actinic skin damage  Patient interested in PDT to scalp following  Mohs.     AK (actinic keratosis)  - Discussed diagnosis, etiology, and precancerous nature of condition.   - Cryosurgery Procedure Note: Discussed procedure with patient/patient's guardian including risks and benefits as well as treatment alternatives. Risks of procedure include pain, itching, swelling, redness, blistering, crusting, wound formation, post-inflammatory pigmentary alteration, scar, recurrence. Verbal consent obtained. LN2 cryosurgery performed to 2 lesion(s). Patient tolerated procedure well. After-visit wound care instructions reviewed and provided in writing.      Inflamed seborrheic keratosis  - Discussed diagnosis, etiology, and treatment options.   - Cryosurgery Procedure Note: Discussed procedure with patient/patient's guardian including risks and benefits as well as treatment alternatives. Risks of procedure include pain, itching, swelling, redness, blistering, crusting, wound formation, post-inflammatory pigmentary alteration, scar, recurrence. Verbal consent obtained. LN2 cryosurgery performed to 1 lesion(s). Patient tolerated procedure well. After-visit wound care instructions reviewed and provided in writing.           Follow up in about 3 months (around 2/20/2025) for skin check.

## 2024-11-20 NOTE — Clinical Note
NAVIN BLACK this patient is interested in pursuing PDT with you to scalp post Mohs of BCC at scalp. Thanks!

## 2024-12-05 ENCOUNTER — PROCEDURE VISIT (OUTPATIENT)
Dept: DERMATOLOGY | Facility: CLINIC | Age: 79
End: 2024-12-05
Payer: OTHER GOVERNMENT

## 2024-12-05 VITALS
HEART RATE: 67 BPM | DIASTOLIC BLOOD PRESSURE: 67 MMHG | HEIGHT: 69 IN | WEIGHT: 165 LBS | BODY MASS INDEX: 24.44 KG/M2 | SYSTOLIC BLOOD PRESSURE: 114 MMHG

## 2024-12-05 DIAGNOSIS — C44.41 BCC (BASAL CELL CARCINOMA), SCALP/NECK: ICD-10-CM

## 2024-12-05 DIAGNOSIS — S01.00XA UNSPECIFIED OPEN WOUND OF SCALP, INITIAL ENCOUNTER: Primary | ICD-10-CM

## 2024-12-05 PROCEDURE — 17312 MOHS ADDL STAGE: CPT | Mod: PBBFAC,PN | Performed by: DERMATOLOGY

## 2024-12-05 PROCEDURE — 17312 MOHS ADDL STAGE: CPT | Mod: S$PBB,,, | Performed by: DERMATOLOGY

## 2024-12-05 PROCEDURE — 17311 MOHS 1 STAGE H/N/HF/G: CPT | Mod: S$PBB,,, | Performed by: DERMATOLOGY

## 2024-12-05 PROCEDURE — 17311 MOHS 1 STAGE H/N/HF/G: CPT | Mod: PBBFAC,PN | Performed by: DERMATOLOGY

## 2024-12-05 NOTE — PROGRESS NOTES
MOHS MICROGRAPHIC SURGERY OPERATIVE NOTE  Name:  Sergio Fagan  Date: 2024  Patient : 1945  Attending Surgeon: Yeison Wilder MD  Assistants: Valeria Gardner - Surgical Technician  Anesthetic Agent: 1% lidocaine with 1:100,000 epinephrine  Clinical Diagnosis: basal cell carcinoma   Operation: Mohs Micrographic Surgery  Location: left parietal scalp  Indications: Biopsy-proven skin cancer of cosmetically and functionally important areas, including head, neck, genital, hand, foot, or areas known for having difficulty in healing, such as the lower anterior legs.  Surgical Preparation: povidone-iodine  Pre-op anbitioics: no     Description of Operation:  The nature and purpose of the procedure, associated risks and alternative treatments were explained to the patient in detail. All patient questions were answered completely. An informed operative consent and photography permit were obtained. The tumor location was then identified and marked with agreement by the patient of the correct location. The patient was positioned, prepped, and draped in the usual sterile manner. Local anesthesia was obtained with 4 cc(s) of 1% lidocaine with 1:100,000 epinephrine. The lesion pre-operatively measures 1.7 by 1.6 cm.     1ST STAGE:  A 2+ mm rim of normal appearing skin was marked circumferentially around the lesion after scraping with a curette to define the margin. The area thus outlined was excised at a 45 degree angle. Hemostasis was obtained with electrodesiccation. The specimen was oriented, mapped, and subdivided into at least two sections. Sections were then chromacoded and submitted for horizontal frozen sections. The patient tolerated the procedure well and there were no complications. Upon microscopic examination of the processed horizontal frozen sections of this stage:  Tumor was present at the margin of the specimen; these areas of residual tumor were marked on the reference map with red pencil,  pinpointing the location in which further tissue excision was necessary.    Tumor type noted on stage 1: Nodular basal cell carcinoma: Nodular tumor in dermis composed of basaloid cells exhibiting peripheral palisading and retraction artifact.     SUBSEQUENT STAGES: The patient returned to the operating room for additional stages of tumor removal, and prepped in the manner described above. Surgery was directed to the areas having residual tumor, with thin layers of tissue being excised from these regions. A new reference map was prepared during the surgery to maintain precise orientation as described above. Hemostasis was achieved and the excised tissue was processed for microscopic analysis. These sections were then examined by the Mohs surgeon, and areas of persistent tumor were indicated on the reference map. This process was repeated until the frozen horizontal sections of STAGE 3 revealed no further tumor cells and tumor eradication was considered to be complete.  Tumor type noted on subsequent stages: Nodular basal cell carcinoma: Nodular tumor in dermis composed of basaloid cells exhibiting peripheral palisading and retraction artifact.       SUMMARY:  The tumor was extirpated in 3 Mohs Stages resulting in a final defect measuring 2.2 by 2.0 cm².   The final defect extended deep to dermis  The patient tolerated the procedure well and no complications were noted.  Due to location and patient preference, lesion will heal by second intention.       DEFECT PHOTO:      Yeiosn Wilder MD

## 2024-12-12 ENCOUNTER — OFFICE VISIT (OUTPATIENT)
Dept: DERMATOLOGY | Facility: CLINIC | Age: 79
End: 2024-12-12
Payer: MEDICARE

## 2024-12-12 DIAGNOSIS — S01.00XD OPEN WOUND OF SCALP, UNSPECIFIED OPEN WOUND TYPE, SUBSEQUENT ENCOUNTER: Primary | ICD-10-CM

## 2024-12-12 PROCEDURE — 99212 OFFICE O/P EST SF 10 MIN: CPT | Mod: S$PBB,,, | Performed by: DERMATOLOGY

## 2024-12-12 NOTE — PROGRESS NOTES
Dermatologic Surgery Wound Check Note  Name: Sergio Fagan   Date: 2024  Patient : 1945    Attending Surgeon: Yeison Wilder MD FAAD  Assistants: Valeria Gardner - Surgical Technician  Location: scalp    Subjective: Patient presents today for wound check on the scalp. Patient reports no issues.     Objective:  Derm Physical Exam     Wound healing well good granulation tissue noted at base, no s/s of infection or excessive inflammation  - minimal fibrin deposition no debridement needed beyond saline scrub today      Assessment and plan:  - Discussed waiting until scalp is fully healed to do PDT treatment on scalp  - continue gentle wound care (soapy fingers QD followed by polysporin, aquaphor, vaseline, or cerave healing ointment, then telfa pad, and tape)  - Follow up in 1 week, will schedule if approved for [employer]  There are no diagnoses linked to this encounter.

## 2024-12-19 ENCOUNTER — TELEPHONE (OUTPATIENT)
Dept: DERMATOLOGY | Facility: CLINIC | Age: 79
End: 2024-12-19
Payer: OTHER GOVERNMENT

## 2024-12-19 ENCOUNTER — OFFICE VISIT (OUTPATIENT)
Dept: DERMATOLOGY | Facility: CLINIC | Age: 79
End: 2024-12-19
Payer: MEDICARE

## 2024-12-19 DIAGNOSIS — S01.00XD UNSPECIFIED OPEN WOUND OF SCALP, SUBSEQUENT ENCOUNTER: Primary | ICD-10-CM

## 2024-12-19 PROCEDURE — 99212 OFFICE O/P EST SF 10 MIN: CPT | Mod: S$PBB,,, | Performed by: DERMATOLOGY

## 2024-12-19 NOTE — PROGRESS NOTES
Dermatologic Surgery Wound Check Note  Name: Sergio Fagan   Date: 2024  Patient : 1945    Attending Surgeon: Yeison Wilder MD FAAD  Assistants: Valeria Gardner - Surgical Technician  Location: left scalp    Subjective: Patient presents today for wound check on the scalp. Patient reports no complications. Waiting on collagen matrix approval from [facility] as it is hard for him to care for this area. Has had mepilex, ointment and bandage in place for 1 week continuously with no reported issues.    Objective:  Physical Exam   Head   Head comments: Wound granulating well no s/s of infection or inflammation             Assessment and plan:    Diagnoses and all orders for this visit:    Unspecified open wound of scalp, subsequent encounter  - continue gentle wound care (soapy fingers QD followed by polysporin, aquaphor, vaseline, or cerave healing ointment, then telfa pad, and tape)  - follow up 1 week, if approved may have him come sooner for puraply application

## 2024-12-20 ENCOUNTER — TELEPHONE (OUTPATIENT)
Dept: DERMATOLOGY | Facility: CLINIC | Age: 79
End: 2024-12-20
Payer: OTHER GOVERNMENT

## 2024-12-20 NOTE — TELEPHONE ENCOUNTER
----- Message from Agnes sent at 12/20/2024 11:01 AM CST -----  Contact: pt  Type:  Patient Returning Call    Who Called:pt    Who Left Message for Patient: office    Does the patient know what this is regarding?: his bandage    Would the patient rather a call back or a response via [facility]?  Call back    Best Call Back Number: [phone]      Additional Information:  please call to advise    Thanks

## 2024-12-24 ENCOUNTER — CLINICAL SUPPORT (OUTPATIENT)
Dept: DERMATOLOGY | Facility: CLINIC | Age: 79
End: 2024-12-24
Payer: MEDICARE

## 2024-12-24 DIAGNOSIS — Z51.89 VISIT FOR WOUND CHECK: Primary | ICD-10-CM

## 2025-01-02 ENCOUNTER — OFFICE VISIT (OUTPATIENT)
Dept: DERMATOLOGY | Facility: CLINIC | Age: 80
End: 2025-01-02
Payer: OTHER GOVERNMENT

## 2025-01-02 DIAGNOSIS — S01.00XD UNSPECIFIED OPEN WOUND OF SCALP, SUBSEQUENT ENCOUNTER: Primary | ICD-10-CM

## 2025-01-02 PROCEDURE — 99211 OFF/OP EST MAY X REQ PHY/QHP: CPT | Mod: PBBFAC,PN | Performed by: DERMATOLOGY

## 2025-01-02 PROCEDURE — 99212 OFFICE O/P EST SF 10 MIN: CPT | Mod: S$PBB,,, | Performed by: DERMATOLOGY

## 2025-01-02 PROCEDURE — 99999 PR PBB SHADOW E&M-EST. PATIENT-LVL I: CPT | Mod: PBBFAC,,, | Performed by: DERMATOLOGY

## 2025-01-02 NOTE — PROGRESS NOTES
"Dermatologic Surgery Wound Check Note  Name: Sergio Fagan   Date: 2025  Patient : 1945    Attending Surgeon: Yeison Wilder MD FAAD  Assistants: Valeria Gardner - Surgical Technician  Location: scalp     Subjective: Patient presents today for wound check on the scalp post MMS healed by second intention. Patient reports no issues. Was approved for puraply but mostly granulated, no need to apply.     Objective:    Physical Exam   Head   Head comments: Wound healing well almost fully granulated but not re-epithelialized             Assessment and plan:      Sergio Farley" was seen today for wound check.    Diagnoses and all orders for this visit:    Unspecified open wound of scalp, subsequent encounter      - continue gentle wound care (soapy fingers QD followed by polysporin, aquaphor, vaseline, or cerave healing ointment, then telfa pad, and tape)   - After 1 week, let area dry out and scab to prevent hypergranulation    RTC in 3 weeks for follow up         "

## 2025-02-18 ENCOUNTER — CLINICAL SUPPORT (OUTPATIENT)
Dept: DERMATOLOGY | Facility: CLINIC | Age: 80
End: 2025-02-18
Payer: OTHER GOVERNMENT

## 2025-02-18 DIAGNOSIS — D48.5 NEOPLASM OF UNCERTAIN BEHAVIOR OF SKIN: ICD-10-CM

## 2025-02-18 DIAGNOSIS — L57.0 AK (ACTINIC KERATOSIS): Primary | ICD-10-CM

## 2025-02-18 PROCEDURE — 99211 OFF/OP EST MAY X REQ PHY/QHP: CPT | Mod: PBBFAC,PN | Performed by: DERMATOLOGY

## 2025-02-18 PROCEDURE — 11102 TANGNTL BX SKIN SINGLE LES: CPT | Mod: PBBFAC,PN | Performed by: DERMATOLOGY

## 2025-02-18 PROCEDURE — 96574 DBRDMT PRMLG LES W/PDT: CPT | Mod: PBBFAC,PN | Performed by: DERMATOLOGY

## 2025-02-18 NOTE — PROGRESS NOTES
PHOTODYNAMIC THERAPY PROCEDURE NOTE  Patient: Sergio Fagan  YOB: 1945  ATTENDING PHYSICIAN: Yeison Wilder MD     PROCEDURE: photodynamic therapy for the treatment of refractory actinic keratosis    SKIN PREP: Acetone was applied to the skin vigorously with roughly woven gauze to degrease the epidermis and allow penetration of the aminolevulinic acid solution. This gauze was used to debride the skin surface until actinic keratoses were de-roofed with subsequent bleeding of heavily sun damaged areas.    LOCATION: scalp    PDT solution: 1 Levulan Kerastick(s) was(were) applied to the area, NDC: 35954-771-99     INCUBATION TIME: 2 hours allowed for PDT solution to penetrate into actinic keratoses    PROCEDURE:     After discussion of risks, benefits, and limitations of photodynamic therapy, patient placed in seated position with Pablito-U light approximately 4 inches from the treatment area. Protective goggles/glasses were placed on the patient to protect the eyes from the intense light of the device. The timer was set to 17:30 and the physician activated the device. The patient was given a fan to assist with burning during the procedure. Any intensely burning areas were dabbed with cool saline gauze. They were also given a call button to summon assistance from the nursing station if any issues were experienced during treatment.     After treatment, they were provided with a broad brimmed hat if they did not bring one. The patient tolerated the procedure well, and will follow up in 2 weeks for a post PDT wound check, and assessment of weather a second PDT treatment would be beneficial for the area. Patient instructed not to get any additional sun exposure for 2 days post-op. Patient instructed to keep aloe or vaseline in the fridge and apply liberally as needed for discomfort.    Patient provided with written discharge instructions and instructed to call clinic for any concerns.       Yeison  MD Tina     Dermatology Outpatient Clinic Progress Note    Patient Name: Sergio Fagan  Patient : 1945  MRN: [id number]  Date of Service: 2025    CC/HPI: Sergio Fagan is a 79 y.o. year old male with history of squamous cell carcinoma who presents today for PDT.  Patient reports incidental lesion growing on the left posterior shoulder    ROS:   Dermatological ROS: positive for skin lesion changes    Physical Exam   Back   Back comments: Erythematous scaly papule with central del         Diagram Legend     Erythematous scaling macule/papule c/w actinic keratosis       Vascular papule c/w angioma      Pigmented verrucoid papule/plaque c/w seborrheic keratosis      Yellow umbilicated papule c/w sebaceous hyperplasia      Irregularly shaped tan macule c/w lentigo     1-2 mm smooth white papules consistent with Milia      Movable subcutaneous cyst with punctum c/w epidermal inclusion cyst      Subcutaneous movable cyst c/w pilar cyst      Firm pink to brown papule c/w dermatofibroma      Pedunculated fleshy papule(s) c/w skin tag(s)      Evenly pigmented macule c/w junctional nevus     Mildly variegated pigmented, slightly irregular-bordered macule c/w mildly atypical nevus      Flesh colored to evenly pigmented papule c/w intradermal nevus       Pink pearly papule/plaque c/w basal cell carcinoma      Erythematous hyperkeratotic cursted plaque c/w SCC      Surgical scar with no sign of skin cancer recurrence      Open and closed comedones      Inflammatory papules and pustules      Verrucoid papule consistent consistent with wart     Erythematous eczematous patches and plaques     Dystrophic onycholytic nail with subungual debris c/w onychomycosis     Umbilicated papule    Erythematous-base heme-crusted tan verrucoid plaque consistent with inflamed seborrheic keratosis     Erythematous Silvery Scaling Plaque c/w Psoriasis     See annotation    Assessment/Plan:    Diagnoses and all orders for this  visit:    AK (actinic keratosis)    Neoplasm of uncertain behavior of skin  -     Specimen to Pathology, Dermatology    Shave Biopsy Procedure Note  Risks, benefits, limitations of shave biopsy discussed. Areas cleansed with alcohol, photographs of suspicious lesions taken in [facility]. 1 lesion(s) numbed with 1% lidocaine with 1:200,000 epinephrine. Lesions removed with razor surgery and sent for pathology in formalin. Hemostasis achieved with alumuninum chloride. Polysporin and a band-aid applied.  Will call patient with results once available. Patient tolerated procedure well.       Follow up in 2 weeks        Yeison Wilder MD FAAD

## 2025-02-25 ENCOUNTER — RESULTS FOLLOW-UP (OUTPATIENT)
Dept: DERMATOLOGY | Facility: CLINIC | Age: 80
End: 2025-02-25

## 2025-03-05 ENCOUNTER — OFFICE VISIT (OUTPATIENT)
Dept: DERMATOLOGY | Facility: CLINIC | Age: 80
End: 2025-03-05
Payer: OTHER GOVERNMENT

## 2025-03-05 DIAGNOSIS — L57.0 AK (ACTINIC KERATOSIS): Primary | ICD-10-CM

## 2025-03-05 DIAGNOSIS — L57.0 ACTINIC KERATOSIS: ICD-10-CM

## 2025-03-05 DIAGNOSIS — C44.529 SQUAMOUS CELL CARCINOMA, TRUNK: ICD-10-CM

## 2025-03-05 PROCEDURE — 99212 OFFICE O/P EST SF 10 MIN: CPT | Mod: 25,S$PBB,, | Performed by: DERMATOLOGY

## 2025-03-05 PROCEDURE — 17261 DSTRJ MAL LES T/A/L .6-1.0CM: CPT | Mod: S$PBB,,, | Performed by: DERMATOLOGY

## 2025-03-05 PROCEDURE — 99213 OFFICE O/P EST LOW 20 MIN: CPT | Mod: PBBFAC,PN | Performed by: DERMATOLOGY

## 2025-03-05 PROCEDURE — 99999 PR PBB SHADOW E&M-EST. PATIENT-LVL III: CPT | Mod: PBBFAC,,, | Performed by: DERMATOLOGY

## 2025-03-05 PROCEDURE — 17261 DSTRJ MAL LES T/A/L .6-1.0CM: CPT | Mod: PBBFAC,PN | Performed by: DERMATOLOGY

## 2025-03-05 NOTE — PROGRESS NOTES
Dermatology Outpatient Clinic Progress Note    Patient Name: Sergio Fagan  Patient : 1945  MRN: [id number]  Date of Service: 3/5/2025    CC/HPI: Sergio Fagan is a 79 y.o. year old male with history of actinic keratosis who presents today for 2 week f/u post PDT scalp.   Patient reports small-moderate reaction with minimal peeling, minimal pain.     ROS:   Dermatological ROS: positive for skin lesion changes    Physical Exam   Head   Head comments: Moderate reaction with some residual AKs, some areas still peeling           Diagram Legend     Erythematous scaling macule/papule c/w actinic keratosis       Vascular papule c/w angioma      Pigmented verrucoid papule/plaque c/w seborrheic keratosis      Yellow umbilicated papule c/w sebaceous hyperplasia      Irregularly shaped tan macule c/w lentigo     1-2 mm smooth white papules consistent with Milia      Movable subcutaneous cyst with punctum c/w epidermal inclusion cyst      Subcutaneous movable cyst c/w pilar cyst      Firm pink to brown papule c/w dermatofibroma      Pedunculated fleshy papule(s) c/w skin tag(s)      Evenly pigmented macule c/w junctional nevus     Mildly variegated pigmented, slightly irregular-bordered macule c/w mildly atypical nevus      Flesh colored to evenly pigmented papule c/w intradermal nevus       Pink pearly papule/plaque c/w basal cell carcinoma      Erythematous hyperkeratotic cursted plaque c/w SCC      Surgical scar with no sign of skin cancer recurrence      Open and closed comedones      Inflammatory papules and pustules      Verrucoid papule consistent consistent with wart     Erythematous eczematous patches and plaques     Dystrophic onycholytic nail with subungual debris c/w onychomycosis     Umbilicated papule    Erythematous-base heme-crusted tan verrucoid plaque consistent with inflamed seborrheic keratosis     Erythematous Silvery Scaling Plaque c/w Psoriasis     See  "annotation    Assessment/Plan:    Sergio Farley" was seen today for actinic keratosis.    Diagnoses and all orders for this visit:    Actinic keratosis  - repeat PDT in 4 weeks to the scalp with 3 hour incubation    Squamous cell carcinoma, trunk    Electrodesiccation and Curettage  Diagnosis: squamous cell carcinoma - keratoacanthoma type  Location: left upper back    Procedure description:   After discussion of risks, benefits, limitations of electrodesiccation and curettage and its alternatives, informed consent obtained from the patient.   Anesthesia obtained with 3 cc(s) of 1% lidocaine with 1:100,000 epinephrine.   Curettage followed by desiccation with a monopolar hyfrecator is performed in three successive passes so as to eradicate remaining tumor cells and obtain clear surgical margins. Hemostasis is obtained with the final round of hyfrecation, and augmented via the application of aluminum chloride to the wound bed. The area is dressed with Polysporin, non-adherent gauze and a pressure dressing were applied to wound after gentle cleansing with saline.. Patient tolerated the procedure well and instructed in the proper wound care. Advised to follow up in 3 months to monitor for recurrence of the lesion.   Patient is to call for any signs of bleeding, infection, or any other difficulty encountered with wound healing.     Post Op Size: 0.9 cm     Follow up in 4 weeks        Yeison Wilder MD FAAD        "

## 2025-03-31 ENCOUNTER — TELEPHONE (OUTPATIENT)
Dept: DERMATOLOGY | Facility: CLINIC | Age: 80
End: 2025-03-31
Payer: OTHER GOVERNMENT

## 2025-03-31 NOTE — TELEPHONE ENCOUNTER
----- Message from Keely sent at 3/31/2025 11:19 AM CDT -----  Regarding: Sooner Reschedule Appointment Request  Contact: patient at [phone]  Type:  Sooner Reschedule Appointment RequestName of Caller:  patient at [phone] Information:  Patient would like to reschedule appointment for 4/2/2025 and would like the new time and date sent via mail. Thank you.

## 2025-04-11 ENCOUNTER — TELEPHONE (OUTPATIENT)
Dept: NEUROLOGY | Facility: CLINIC | Age: 80
End: 2025-04-11
Payer: OTHER GOVERNMENT

## 2025-04-11 NOTE — TELEPHONE ENCOUNTER
----- Message from Pieter Huertas sent at 4/11/2025  3:15 PM CDT -----  Contact: self    ----- Message -----  From: Roma Porter  Sent: 4/11/2025   2:22 PM CDT  To: [facility] Staff    Type:  Appointment RequestName of Caller: Pt When is the first available appointment? Pt is scheduled for 4/14 Symptoms: NP-PDWould the patient rather a call back or a response via [facility]? Call Best Call Back Number: [phone] call to advise... Thank you...

## 2025-04-11 NOTE — TELEPHONE ENCOUNTER
Spoke with patient and informed [facility] will not have any new patient appointments available in [place] until October at the soonest. Added to list to call when October schedule opens.

## 2025-04-17 ENCOUNTER — TELEPHONE (OUTPATIENT)
Dept: DERMATOLOGY | Facility: CLINIC | Age: 80
End: 2025-04-17
Payer: MEDICARE

## 2025-04-17 NOTE — TELEPHONE ENCOUNTER
----- Message from Keely sent at 4/17/2025 12:30 PM CDT -----  Regarding: Reschedule  Appointment Request  Type:  Reschedule  Appointment RequestName of Caller:  Patient at [phone] Information:  Patient had no transportation to get to appointment on 4/17, and he would like to reschedule. Please call and advise. Thank you.

## 2025-05-07 ENCOUNTER — PROCEDURE VISIT (OUTPATIENT)
Dept: DERMATOLOGY | Facility: CLINIC | Age: 80
End: 2025-05-07
Payer: OTHER GOVERNMENT

## 2025-05-07 DIAGNOSIS — C44.529 SQUAMOUS CELL CARCINOMA, TRUNK: ICD-10-CM

## 2025-05-07 DIAGNOSIS — L57.0 ACTINIC KERATOSIS: Primary | ICD-10-CM

## 2025-05-07 DIAGNOSIS — L57.8 ACTINIC SKIN DAMAGE: ICD-10-CM

## 2025-05-07 PROCEDURE — 96574 DBRDMT PRMLG LES W/PDT: CPT | Mod: S$PBB,,, | Performed by: DERMATOLOGY

## 2025-05-07 PROCEDURE — 99999PBSHW PR PBB SHADOW TECHNICAL ONLY FILED TO HB: Mod: PBBFAC,,,

## 2025-05-07 PROCEDURE — 17262 DSTRJ MAL LES T/A/L 1.1-2.0: CPT | Mod: S$PBB,,, | Performed by: DERMATOLOGY

## 2025-05-07 PROCEDURE — 96574 DBRDMT PRMLG LES W/PDT: CPT | Mod: PBBFAC,PN | Performed by: DERMATOLOGY

## 2025-05-07 PROCEDURE — 17262 DSTRJ MAL LES T/A/L 1.1-2.0: CPT | Mod: PBBFAC,PN | Performed by: DERMATOLOGY

## 2025-05-07 PROCEDURE — 99499 UNLISTED E&M SERVICE: CPT | Mod: S$PBB,,, | Performed by: DERMATOLOGY

## 2025-05-07 NOTE — Clinical Note
Needs follow up with JESSE :) Doing pretty well grew a KA on the back I tx'd with ED&C and his scalp was still a bit messy looking so just completed a second more aggressive PDT treatment.

## 2025-05-07 NOTE — PROGRESS NOTES
Dermatology Outpatient Clinic Progress Note    Patient Name: Sergio Fagan  Patient : 1945  MRN: [id number]  Date of Service: 2025    Subjective:      CC/HPI: Sergio Fagan is a 79 y.o. year old male with history of squamous cell carcinoma who presents today for follow up of PDT and ED&C of SCC - KA type.  Patient reports lesion on the shoulder mostly resolved, scalp still has some spots, also has a scaly spot behind the right ear and on the left lateral eyebrow    ROS:   Review of Systems    Objective:   Physical Exam   Constitutional: He appears well-developed and well-nourished. No distress.   Skin:                    Diagram Legend     Erythematous scaling macule/papule c/w actinic keratosis       Vascular papule c/w angioma      Pigmented verrucoid papule/plaque c/w seborrheic keratosis      Yellow umbilicated papule c/w sebaceous hyperplasia      Irregularly shaped tan macule c/w lentigo     1-2 mm smooth white papules consistent with Milia      Movable subcutaneous cyst with punctum c/w epidermal inclusion cyst      Subcutaneous movable cyst c/w pilar cyst      Firm pink to brown papule c/w dermatofibroma      Pedunculated fleshy papule(s) c/w skin tag(s)      Evenly pigmented macule c/w junctional nevus     Mildly variegated pigmented, slightly irregular-bordered macule c/w mildly atypical nevus      Flesh colored to evenly pigmented papule c/w intradermal nevus       Pink pearly papule/plaque c/w basal cell carcinoma      Erythematous hyperkeratotic cursted plaque c/w SCC      Surgical scar with no sign of skin cancer recurrence      Open and closed comedones      Inflammatory papules and pustules      Verrucoid papule consistent consistent with wart     Erythematous eczematous patches and plaques     Dystrophic onycholytic nail with subungual debris c/w onychomycosis     Umbilicated papule    Erythematous-base heme-crusted tan verrucoid plaque consistent with inflamed seborrheic keratosis      Erythematous Silvery Scaling Plaque c/w Psoriasis     See annotation      Assessment / Plan:        Actinic keratosis  Cryosurgery Procedure Note  The patient was informed that we would be destroying lesions via cryosurgery, the risks of pain, vesiculation, and potentially persistent discoloration were discussed.   Patient expressed verbal consent. 2 premalignant  lesions were treated with a 15 second freeze thaw cycle. Thicker lesions were treated with two cycles to enhance chances of complete resolution. Patient tolerated procedure well, no wound care necessary.     Follow up in 6 weeks if lesions do not resolve.       Actinic skin damage    PHOTODYNAMIC THERAPY PROCEDURE NOTE  Patient: Sergio Fagan  YOB: 1945  ATTENDING PHYSICIAN: Yeison Wilder MD     PROCEDURE: photodynamic therapy for the treatment of refractory actinic keratosis    SKIN PREP: Acetone was applied to the skin vigorously with roughly woven gauze to degrease the epidermis and allow penetration of the aminolevulinic acid solution. This gauze was used to debride the skin surface until actinic keratoses were de-roofed with subsequent bleeding of heavily sun damaged areas.    LOCATION: [facility]    PDT solution: 1 Levulan Kerastick(s) was(were) applied to the area, NDC: 83082-078-64     INCUBATION TIME: 180 minutes allowed for PDT solution to penetrate into actinic keratoses    PROCEDURE:     After discussion of risks, benefits, and limitations of photodynamic therapy, patient placed in seated position with Pablito-U light approximately 4 inches from the treatment area. Protective goggles/glasses were placed on the patient to protect the eyes from the intense light of the device. The timer was set to 17:30 and the physician activated the device. The patient was given a fan to assist with burning during the procedure. Any intensely burning areas were dabbed with cool saline gauze. They were also given a call button to [facility]  assistance from the nursing station if any issues were experienced during treatment.     After treatment, they were provided with a broad brimmed hat if they did not bring one. The patient tolerated the procedure well, and will follow up in 2 weeks for a post PDT wound check, and assessment of weather a second PDT treatment would be beneficial for the area. Patient instructed not to get any additional sun exposure for 2 days post-op. Patient instructed to keep aloe or vaseline in the fridge and apply liberally as needed for discomfort.    Patient provided with written discharge instructions and instructed to call clinic for any concerns.       Squamous cell carcinoma, trunk  Electrodesiccation and Curettage  Diagnosis: squamous cell carcinoma - keratoacanthoma type  Location: left upper back    Procedure description:   After discussion of risks, benefits, limitations of electrodesiccation and curettage and its alternatives, informed consent obtained from the patient.   Anesthesia obtained with 2 cc(s) of 1% lidocaine with 1:100,000 epinephrine.   Curettage followed by desiccation with a monopolar hyfrecator is performed in three successive passes so as to eradicate remaining tumor cells and obtain clear surgical margins. Hemostasis is obtained with the final round of hyfrecation, and augmented via the application of aluminum chloride to the wound bed. The area is dressed with Polysporin, non-adherent gauze and a pressure dressing were applied to wound after gentle cleansing with saline.. Patient tolerated the procedure well and instructed in the proper wound care. Advised to follow up in 3 months to monitor for recurrence of the lesion.   Patient is to call for any signs of bleeding, infection, or any other difficulty encountered with wound healing.     Post Op Size: 1.2 cm       Follow up in 2 weeks        MD KEVIN Gautam

## 2025-07-04 NOTE — TELEPHONE ENCOUNTER
----- Message from Stefanie Oliva MD sent at 5/31/2022  1:21 PM CDT -----  Referral from Dr Moreno for cancer forehead  Please schedule him next available new patient  Thanks  ----- Message -----  From: Sharif Moreno MD  Sent: 5/31/2022  10:32 AM CDT  To: Stefanie Oliva MD, Hazel Watts,   This gentleman, Mr. Fagan, had a biopsy of a superficially invasive squamous cell carcinoma (path below) on the right brow by Edmond Cagle, one of the general derms here.   Edmond referred him to me, but when Madeline called him to schedule, he told her that he can't have any surgery except under general anesthesia (he apparently has a panic response otherwise).   Would you mind seeing him?   Although I haven't actually seen him, I assume I can place a referral order if needed. Let me know.   Thanks,   Sharif  ========================  ----- Message -----  From: Kandy Sánchez CST  Sent: 5/31/2022  10:19 AM CDT  To: Kandy Sánchez CST, Sharif Moreno MD    The above patient said that he can not have surgery unless it is under general surgery.     ========================  Final Pathologic Diagnosis Skin, right brow, shave biopsy:   -SQUAMOUS CELL CARCINOMA,  SUPERFICIALLY INVASIVE, EXCISED IN THE PLANES OF   SECTIONS EXAMINED   This lesion is skin cancer. You will be contacted regarding treatment.   Comment: Interp By Lotus Garcia M.D., Signed on 05/26/2022 at 17:04         No...

## 2025-09-05 ENCOUNTER — TELEPHONE (OUTPATIENT)
Facility: CLINIC | Age: 80
End: 2025-09-05
Payer: MEDICARE